# Patient Record
Sex: MALE | Race: WHITE | NOT HISPANIC OR LATINO | Employment: OTHER | URBAN - METROPOLITAN AREA
[De-identification: names, ages, dates, MRNs, and addresses within clinical notes are randomized per-mention and may not be internally consistent; named-entity substitution may affect disease eponyms.]

---

## 2021-12-01 ENCOUNTER — TELEPHONE (OUTPATIENT)
Dept: DIABETES | Facility: CLINIC | Age: 56
End: 2021-12-01
Payer: COMMERCIAL

## 2021-12-15 ENCOUNTER — OFFICE VISIT (OUTPATIENT)
Dept: DIABETES | Facility: CLINIC | Age: 56
End: 2021-12-15
Payer: COMMERCIAL

## 2021-12-15 VITALS
SYSTOLIC BLOOD PRESSURE: 126 MMHG | DIASTOLIC BLOOD PRESSURE: 82 MMHG | HEIGHT: 67 IN | WEIGHT: 177.25 LBS | BODY MASS INDEX: 27.82 KG/M2 | HEART RATE: 100 BPM

## 2021-12-15 DIAGNOSIS — I70.90 ATHEROSCLEROSIS: ICD-10-CM

## 2021-12-15 DIAGNOSIS — Z79.4 UNCONTROLLED TYPE 2 DIABETES MELLITUS WITH HYPERGLYCEMIA, WITH LONG-TERM CURRENT USE OF INSULIN: Primary | ICD-10-CM

## 2021-12-15 DIAGNOSIS — E11.65 UNCONTROLLED TYPE 2 DIABETES MELLITUS WITH HYPERGLYCEMIA, WITH LONG-TERM CURRENT USE OF INSULIN: Primary | ICD-10-CM

## 2021-12-15 DIAGNOSIS — E11.69 DYSLIPIDEMIA WITH LOW HIGH DENSITY LIPOPROTEIN (HDL) CHOLESTEROL WITH HYPERTRIGLYCERIDEMIA DUE TO TYPE 2 DIABETES MELLITUS: ICD-10-CM

## 2021-12-15 DIAGNOSIS — E78.2 DYSLIPIDEMIA WITH LOW HIGH DENSITY LIPOPROTEIN (HDL) CHOLESTEROL WITH HYPERTRIGLYCERIDEMIA DUE TO TYPE 2 DIABETES MELLITUS: ICD-10-CM

## 2021-12-15 DIAGNOSIS — I10 ESSENTIAL HYPERTENSION: ICD-10-CM

## 2021-12-15 PROBLEM — T82.9XXA SIGNIFICANT DISEASE OF CORONARY BYPASS GRAFT: Status: ACTIVE | Noted: 2021-12-15

## 2021-12-15 PROBLEM — M54.16 LUMBAR RADICULOPATHY: Status: ACTIVE | Noted: 2021-07-22

## 2021-12-15 PROBLEM — K58.0 IRRITABLE BOWEL SYNDROME WITH DIARRHEA: Status: ACTIVE | Noted: 2021-12-15

## 2021-12-15 PROBLEM — K21.9 GASTROESOPHAGEAL REFLUX DISEASE: Status: ACTIVE | Noted: 2021-12-15

## 2021-12-15 LAB — GLUCOSE SERPL-MCNC: 195 MG/DL (ref 70–110)

## 2021-12-15 PROCEDURE — 99999 PR PBB SHADOW E&M-EST. PATIENT-LVL III: CPT | Mod: PBBFAC,,, | Performed by: NURSE PRACTITIONER

## 2021-12-15 PROCEDURE — 99213 OFFICE O/P EST LOW 20 MIN: CPT | Mod: S$GLB,,, | Performed by: NURSE PRACTITIONER

## 2021-12-15 PROCEDURE — 82962 GLUCOSE BLOOD TEST: CPT | Mod: S$GLB,,, | Performed by: NURSE PRACTITIONER

## 2021-12-15 PROCEDURE — 82962 POCT GLUCOSE, HAND-HELD DEVICE: ICD-10-PCS | Mod: S$GLB,,, | Performed by: NURSE PRACTITIONER

## 2021-12-15 PROCEDURE — 99213 PR OFFICE/OUTPT VISIT, EST, LEVL III, 20-29 MIN: ICD-10-PCS | Mod: S$GLB,,, | Performed by: NURSE PRACTITIONER

## 2021-12-15 PROCEDURE — 99999 PR PBB SHADOW E&M-EST. PATIENT-LVL III: ICD-10-PCS | Mod: PBBFAC,,, | Performed by: NURSE PRACTITIONER

## 2021-12-15 RX ORDER — METFORMIN HYDROCHLORIDE 1000 MG/1
500 TABLET ORAL 2 TIMES DAILY
COMMUNITY
Start: 2021-11-16 | End: 2021-12-15 | Stop reason: ALTCHOICE

## 2021-12-15 RX ORDER — SEMAGLUTIDE 1.34 MG/ML
INJECTION, SOLUTION SUBCUTANEOUS
COMMUNITY
Start: 2021-12-01 | End: 2021-12-15

## 2021-12-15 RX ORDER — METFORMIN HYDROCHLORIDE 500 MG/1
500 TABLET, EXTENDED RELEASE ORAL 2 TIMES DAILY WITH MEALS
Qty: 60 TABLET | Refills: 5 | Status: SHIPPED | OUTPATIENT
Start: 2021-12-15 | End: 2022-01-24 | Stop reason: SDUPTHER

## 2021-12-15 RX ORDER — CLOPIDOGREL BISULFATE 75 MG/1
75 TABLET ORAL DAILY
COMMUNITY
Start: 2021-10-16

## 2021-12-15 RX ORDER — METFORMIN HYDROCHLORIDE 500 MG/1
500 TABLET, EXTENDED RELEASE ORAL 2 TIMES DAILY WITH MEALS
COMMUNITY
End: 2021-12-15 | Stop reason: SDUPTHER

## 2021-12-15 RX ORDER — NAPROXEN SODIUM 220 MG/1
81 TABLET, FILM COATED ORAL
COMMUNITY

## 2021-12-15 RX ORDER — SEMAGLUTIDE 1.34 MG/ML
1 INJECTION, SOLUTION SUBCUTANEOUS
Qty: 1 PEN | Refills: 5 | Status: SHIPPED | OUTPATIENT
Start: 2021-12-15 | End: 2022-01-12 | Stop reason: SDUPTHER

## 2021-12-15 RX ORDER — ATORVASTATIN CALCIUM 40 MG/1
40 TABLET, FILM COATED ORAL NIGHTLY
COMMUNITY
Start: 2021-10-15

## 2021-12-15 RX ORDER — TESTOSTERONE CYPIONATE 200 MG/ML
INJECTION, SOLUTION INTRAMUSCULAR
COMMUNITY
Start: 2021-09-18

## 2021-12-15 RX ORDER — CICLOPIROX 80 MG/ML
SOLUTION TOPICAL
COMMUNITY
Start: 2021-10-22 | End: 2022-09-06 | Stop reason: SDUPTHER

## 2021-12-15 RX ORDER — PANTOPRAZOLE SODIUM 40 MG/1
40 TABLET, DELAYED RELEASE ORAL DAILY
COMMUNITY
Start: 2021-10-15

## 2021-12-15 RX ORDER — CHOLECALCIFEROL (VITAMIN D3) 50 MCG
CAPSULE ORAL
COMMUNITY

## 2022-01-03 ENCOUNTER — TELEPHONE (OUTPATIENT)
Dept: DIABETES | Facility: CLINIC | Age: 57
End: 2022-01-03
Payer: COMMERCIAL

## 2022-01-03 NOTE — TELEPHONE ENCOUNTER
Has he started the increased dose of Ozempic at 1 mg weekly? I really think he needs to increase the Metformin back to max dose, strict low carb diet, increase water intake. If that does not work, he will need a sooner appt so we can discuss alternative medications to add. Keep me updated.

## 2022-01-03 NOTE — TELEPHONE ENCOUNTER
----- Message from Lorena Woods LPN sent at 1/3/2022  1:36 PM CST -----  Contact: self    ----- Message -----  From: Dieter Starr  Sent: 1/3/2022   1:32 PM CST  To: [facility] Staff    Paulo Esquivel would like a message through [facility] in regards to his medication, he states that his blood sugars is raging from 290 to 190 and what changes he needs to make.

## 2022-01-11 ENCOUNTER — PATIENT MESSAGE (OUTPATIENT)
Dept: DIABETES | Facility: CLINIC | Age: 57
End: 2022-01-11
Payer: COMMERCIAL

## 2022-01-11 DIAGNOSIS — Z79.4 UNCONTROLLED TYPE 2 DIABETES MELLITUS WITH HYPERGLYCEMIA, WITH LONG-TERM CURRENT USE OF INSULIN: ICD-10-CM

## 2022-01-11 DIAGNOSIS — E11.65 UNCONTROLLED TYPE 2 DIABETES MELLITUS WITH HYPERGLYCEMIA, WITH LONG-TERM CURRENT USE OF INSULIN: ICD-10-CM

## 2022-01-12 ENCOUNTER — PATIENT MESSAGE (OUTPATIENT)
Dept: DIABETES | Facility: CLINIC | Age: 57
End: 2022-01-12
Payer: COMMERCIAL

## 2022-01-12 RX ORDER — SEMAGLUTIDE 1.34 MG/ML
1 INJECTION, SOLUTION SUBCUTANEOUS
Qty: 1 PEN | Refills: 5 | Status: SHIPPED | OUTPATIENT
Start: 2022-01-12 | End: 2022-03-16

## 2022-01-18 ENCOUNTER — PATIENT MESSAGE (OUTPATIENT)
Dept: DIABETES | Facility: CLINIC | Age: 57
End: 2022-01-18
Payer: COMMERCIAL

## 2022-01-24 ENCOUNTER — PATIENT MESSAGE (OUTPATIENT)
Dept: DIABETES | Facility: CLINIC | Age: 57
End: 2022-01-24

## 2022-01-24 ENCOUNTER — OFFICE VISIT (OUTPATIENT)
Dept: DIABETES | Facility: CLINIC | Age: 57
End: 2022-01-24
Payer: COMMERCIAL

## 2022-01-24 DIAGNOSIS — Z79.4 UNCONTROLLED TYPE 2 DIABETES MELLITUS WITH HYPERGLYCEMIA, WITH LONG-TERM CURRENT USE OF INSULIN: Primary | ICD-10-CM

## 2022-01-24 DIAGNOSIS — E11.65 UNCONTROLLED TYPE 2 DIABETES MELLITUS WITH HYPERGLYCEMIA, WITH LONG-TERM CURRENT USE OF INSULIN: Primary | ICD-10-CM

## 2022-01-24 PROCEDURE — 99212 PR OFFICE/OUTPT VISIT, EST, LEVL II, 10-19 MIN: ICD-10-PCS | Mod: 95,,, | Performed by: NURSE PRACTITIONER

## 2022-01-24 PROCEDURE — 1159F PR MEDICATION LIST DOCUMENTED IN MEDICAL RECORD: ICD-10-PCS | Mod: CPTII,95,, | Performed by: NURSE PRACTITIONER

## 2022-01-24 PROCEDURE — 99212 OFFICE O/P EST SF 10 MIN: CPT | Mod: 95,,, | Performed by: NURSE PRACTITIONER

## 2022-01-24 PROCEDURE — 1159F MED LIST DOCD IN RCRD: CPT | Mod: CPTII,95,, | Performed by: NURSE PRACTITIONER

## 2022-01-24 PROCEDURE — 1160F RVW MEDS BY RX/DR IN RCRD: CPT | Mod: CPTII,95,, | Performed by: NURSE PRACTITIONER

## 2022-01-24 PROCEDURE — 1160F PR REVIEW ALL MEDS BY PRESCRIBER/CLIN PHARMACIST DOCUMENTED: ICD-10-PCS | Mod: CPTII,95,, | Performed by: NURSE PRACTITIONER

## 2022-01-24 RX ORDER — METFORMIN HYDROCHLORIDE 500 MG/1
1000 TABLET, EXTENDED RELEASE ORAL 2 TIMES DAILY WITH MEALS
Qty: 120 TABLET | Refills: 5 | Status: SHIPPED | OUTPATIENT
Start: 2022-01-24 | End: 2022-06-28

## 2022-01-24 RX ORDER — DULAGLUTIDE 4.5 MG/.5ML
4.5 INJECTION, SOLUTION SUBCUTANEOUS
Qty: 4 PEN | Refills: 5 | Status: SHIPPED | OUTPATIENT
Start: 2022-01-24 | End: 2022-08-22

## 2022-01-24 NOTE — PROGRESS NOTES
Established Patient - Audio Only Telehealth Visit     The patient location is: [place], [place]  The chief complaint leading to consultation is: discuss recently elevated BGs and medication options  Visit type: Virtual visit with audio only (telephone)  Total time spent with patient: 15 minutes       The reason for the audio only service rather than synchronous audio and video virtual visit was related to patient preference/necessity.     Each patient to whom I provide medical services by telemedicine is:  (1) informed of the relationship between the physician and patient and the respective role of any other health care provider with respect to management of the patient; and (2) notified that they may decline to receive medical services by telemedicine and may withdraw from such care at any time. Patient verbally consented to receive this service via voice-only telephone call.    Diabetes Medications             metFORMIN (GLUCOPHAGE-XR) 500 MG ER 24hr tablet Take 1 tablet (500 mg total) by mouth 2 (two) times daily with meals. Taking 2 tabs BID    semaglutide (OZEMPIC) 1 mg/dose (4 mg/3 mL) Inject 1 mg into the skin every 7 days.        HPI: Early January 2022, patient reported elevated BGs between 190-290 mg/dL. His Metformin was increased back to max strength. BGs have slowly improved though it did take a few weeks for AM Bgs to reduce. He reports his fasting blood sugar this morning at 134 mg/dL. He attributes the higher BGs in the morning to eating late in the evenings. He has moved this to 7pm. Expresses issue with cost of Ozempic- has a high deductible. He paid > $700 for this prescription. Will see if Trulicity is more affordable once he is out of the deductible. Prescription sent. Otherwise, he has no questions/concerns and is feeling a lot better.      Assessment and plan:      Diagnoses and all orders for this visit:    Uncontrolled type 2 diabetes mellitus with hyperglycemia, with long-term current use of  insulin  -     dulaglutide (TRULICITY) 4.5 mg/0.5 mL pen injector; Inject 4.5 mg into the skin every 7 days.  -     metFORMIN (GLUCOPHAGE-XR) 500 MG ER 24hr tablet; Take 2 tablets (1,000 mg total) by mouth 2 (two) times daily with meals.      -Continue max dose Metformin 2,000 mg daily divided doses. Will see if Trulicity is more affordable than Ozempic. He does have a high deductible plan but even when deductible is met, he is having to pay around $180 per prescription. Will not remove Ozempic RX until we determine which medication he will continue (ozempic or trulicity). He understands not to take both.     Follow up as scheduled.    This service was not originating from a related E/M service provided within the previous 7 days nor will  to an E/M service or procedure within the next 24 hours or my soonest available appointment.  Prevailing standard of care was able to be met in this audio-only visit.

## 2022-03-02 ENCOUNTER — PATIENT MESSAGE (OUTPATIENT)
Dept: DIABETES | Facility: CLINIC | Age: 57
End: 2022-03-02
Payer: COMMERCIAL

## 2022-03-02 NOTE — TELEPHONE ENCOUNTER
Pt  states it went from 300-700 because charles up the strength of it. States he cant afford it. He would rather take regular insulin everyday. Informed ozempic is to lower his a1c and regular insulin will lower is sugar. Informed I will forward to brian

## 2022-03-16 ENCOUNTER — LAB VISIT (OUTPATIENT)
Dept: LAB | Facility: HOSPITAL | Age: 57
End: 2022-03-16
Attending: NURSE PRACTITIONER
Payer: COMMERCIAL

## 2022-03-16 ENCOUNTER — LAB VISIT (OUTPATIENT)
Dept: LAB | Facility: HOSPITAL | Age: 57
End: 2022-03-16
Payer: COMMERCIAL

## 2022-03-16 ENCOUNTER — OFFICE VISIT (OUTPATIENT)
Dept: DIABETES | Facility: CLINIC | Age: 57
End: 2022-03-16
Payer: COMMERCIAL

## 2022-03-16 VITALS
SYSTOLIC BLOOD PRESSURE: 136 MMHG | DIASTOLIC BLOOD PRESSURE: 78 MMHG | WEIGHT: 174.81 LBS | BODY MASS INDEX: 27.44 KG/M2 | HEIGHT: 67 IN | HEART RATE: 100 BPM

## 2022-03-16 DIAGNOSIS — I70.90 ATHEROSCLEROSIS: ICD-10-CM

## 2022-03-16 DIAGNOSIS — E11.69 DYSLIPIDEMIA WITH LOW HIGH DENSITY LIPOPROTEIN (HDL) CHOLESTEROL WITH HYPERTRIGLYCERIDEMIA DUE TO TYPE 2 DIABETES MELLITUS: ICD-10-CM

## 2022-03-16 DIAGNOSIS — I10 ESSENTIAL HYPERTENSION: ICD-10-CM

## 2022-03-16 DIAGNOSIS — E11.65 UNCONTROLLED TYPE 2 DIABETES MELLITUS WITH HYPERGLYCEMIA, WITH LONG-TERM CURRENT USE OF INSULIN: ICD-10-CM

## 2022-03-16 DIAGNOSIS — Z79.4 UNCONTROLLED TYPE 2 DIABETES MELLITUS WITH HYPERGLYCEMIA, WITH LONG-TERM CURRENT USE OF INSULIN: Primary | ICD-10-CM

## 2022-03-16 DIAGNOSIS — E11.65 UNCONTROLLED TYPE 2 DIABETES MELLITUS WITH HYPERGLYCEMIA, WITH LONG-TERM CURRENT USE OF INSULIN: Primary | ICD-10-CM

## 2022-03-16 DIAGNOSIS — Z79.4 UNCONTROLLED TYPE 2 DIABETES MELLITUS WITH HYPERGLYCEMIA, WITH LONG-TERM CURRENT USE OF INSULIN: ICD-10-CM

## 2022-03-16 DIAGNOSIS — E78.2 DYSLIPIDEMIA WITH LOW HIGH DENSITY LIPOPROTEIN (HDL) CHOLESTEROL WITH HYPERTRIGLYCERIDEMIA DUE TO TYPE 2 DIABETES MELLITUS: ICD-10-CM

## 2022-03-16 LAB
ALBUMIN/CREAT UR: 47.5 UG/MG (ref 0–30)
ANION GAP SERPL CALC-SCNC: 12 MMOL/L (ref 8–16)
BUN SERPL-MCNC: 11 MG/DL (ref 6–20)
CALCIUM SERPL-MCNC: 9.4 MG/DL (ref 8.7–10.5)
CHLORIDE SERPL-SCNC: 108 MMOL/L (ref 95–110)
CO2 SERPL-SCNC: 23 MMOL/L (ref 23–29)
CREAT SERPL-MCNC: 1.1 MG/DL (ref 0.5–1.4)
CREAT UR-MCNC: 202 MG/DL (ref 23–375)
EST. GFR  (AFRICAN AMERICAN): >60 ML/MIN/1.73 M^2
EST. GFR  (NON AFRICAN AMERICAN): >60 ML/MIN/1.73 M^2
ESTIMATED AVG GLUCOSE: 160 MG/DL (ref 68–131)
GLUCOSE SERPL-MCNC: 110 MG/DL (ref 70–110)
GLUCOSE SERPL-MCNC: 90 MG/DL (ref 70–110)
HBA1C MFR BLD: 7.2 % (ref 4–5.6)
MICROALBUMIN UR DL<=1MG/L-MCNC: 96 UG/ML
POTASSIUM SERPL-SCNC: 4.1 MMOL/L (ref 3.5–5.1)
SODIUM SERPL-SCNC: 143 MMOL/L (ref 136–145)

## 2022-03-16 PROCEDURE — 99213 OFFICE O/P EST LOW 20 MIN: CPT | Mod: S$GLB,,, | Performed by: NURSE PRACTITIONER

## 2022-03-16 PROCEDURE — 99999 PR PBB SHADOW E&M-EST. PATIENT-LVL III: ICD-10-PCS | Mod: PBBFAC,,, | Performed by: NURSE PRACTITIONER

## 2022-03-16 PROCEDURE — 99999 PR PBB SHADOW E&M-EST. PATIENT-LVL III: CPT | Mod: PBBFAC,,, | Performed by: NURSE PRACTITIONER

## 2022-03-16 PROCEDURE — 82043 UR ALBUMIN QUANTITATIVE: CPT | Performed by: NURSE PRACTITIONER

## 2022-03-16 PROCEDURE — 1160F PR REVIEW ALL MEDS BY PRESCRIBER/CLIN PHARMACIST DOCUMENTED: ICD-10-PCS | Mod: CPTII,S$GLB,, | Performed by: NURSE PRACTITIONER

## 2022-03-16 PROCEDURE — 1160F RVW MEDS BY RX/DR IN RCRD: CPT | Mod: CPTII,S$GLB,, | Performed by: NURSE PRACTITIONER

## 2022-03-16 PROCEDURE — 83036 HEMOGLOBIN GLYCOSYLATED A1C: CPT | Performed by: NURSE PRACTITIONER

## 2022-03-16 PROCEDURE — 3008F PR BODY MASS INDEX (BMI) DOCUMENTED: ICD-10-PCS | Mod: CPTII,S$GLB,, | Performed by: NURSE PRACTITIONER

## 2022-03-16 PROCEDURE — 80048 BASIC METABOLIC PNL TOTAL CA: CPT | Performed by: NURSE PRACTITIONER

## 2022-03-16 PROCEDURE — 3078F PR MOST RECENT DIASTOLIC BLOOD PRESSURE < 80 MM HG: ICD-10-PCS | Mod: CPTII,S$GLB,, | Performed by: NURSE PRACTITIONER

## 2022-03-16 PROCEDURE — 82962 GLUCOSE BLOOD TEST: CPT | Mod: S$GLB,,, | Performed by: NURSE PRACTITIONER

## 2022-03-16 PROCEDURE — 3078F DIAST BP <80 MM HG: CPT | Mod: CPTII,S$GLB,, | Performed by: NURSE PRACTITIONER

## 2022-03-16 PROCEDURE — 1159F MED LIST DOCD IN RCRD: CPT | Mod: CPTII,S$GLB,, | Performed by: NURSE PRACTITIONER

## 2022-03-16 PROCEDURE — 99213 PR OFFICE/OUTPT VISIT, EST, LEVL III, 20-29 MIN: ICD-10-PCS | Mod: S$GLB,,, | Performed by: NURSE PRACTITIONER

## 2022-03-16 PROCEDURE — 3008F BODY MASS INDEX DOCD: CPT | Mod: CPTII,S$GLB,, | Performed by: NURSE PRACTITIONER

## 2022-03-16 PROCEDURE — 3075F PR MOST RECENT SYSTOLIC BLOOD PRESS GE 130-139MM HG: ICD-10-PCS | Mod: CPTII,S$GLB,, | Performed by: NURSE PRACTITIONER

## 2022-03-16 PROCEDURE — 36415 COLL VENOUS BLD VENIPUNCTURE: CPT | Performed by: NURSE PRACTITIONER

## 2022-03-16 PROCEDURE — 3075F SYST BP GE 130 - 139MM HG: CPT | Mod: CPTII,S$GLB,, | Performed by: NURSE PRACTITIONER

## 2022-03-16 PROCEDURE — 82570 ASSAY OF URINE CREATININE: CPT | Performed by: NURSE PRACTITIONER

## 2022-03-16 PROCEDURE — 82962 POCT GLUCOSE, HAND-HELD DEVICE: ICD-10-PCS | Mod: S$GLB,,, | Performed by: NURSE PRACTITIONER

## 2022-03-16 PROCEDURE — 1159F PR MEDICATION LIST DOCUMENTED IN MEDICAL RECORD: ICD-10-PCS | Mod: CPTII,S$GLB,, | Performed by: NURSE PRACTITIONER

## 2022-03-16 NOTE — PATIENT INSTRUCTIONS
-Schedule eye exam. Have report faxed when completed.    -Labs today.    1. Limit carbs to no more than 30-45 grams with each meal. Never eat carbs by themselves, always add protein. Make snacks low carb or non-carb only; Stick to snack sheet provided    2. Blood sugar goals should be a fasting blood sugar between , and no blood sugars throughout the day over 180 is ok, less than 160 better, less than 140 perfect. Keep a log book and bring with you to all appointments along with your glucose meter.    3. Medications adjusted for today's visit include:  Continue Metformin  mg 2 tablets twice daily.    Continue Trulicity 4.5 mg weekly.     4. Carry glucose tablets with you at all times to treat a low blood sugar episode (less than 70). Chew 4 tablets and re-check blood sugar in 15 minutes. If still low, repeat this. Always call the clinic to give an update for any low blood sugar episodes.    5. Exercise as tolerated- Recommend holding exercise until we get your blood sugars to a safe level.    6. Follow-up for your next visit in 3 months.    7. Please either drop off, fax, or MyChart your readings to me as needed.

## 2022-03-16 NOTE — PROGRESS NOTES
Subjective:         Patient ID: Paulo Esquivel is a 56 y.o. male.  Patient's current PCP is Hussain Toure MD.     Chief Complaint: Diabetes Mellitus    HPI  Paulo sEquivel is a 56 y.o. Unknown male presenting for a new consult with me, previously seen by myself at [facility] for diabetes. Patient has been diagnosed with type 2 diabetes since 2010 .    CURRENT DM MEDICATIONS:   Metformin  mg 2 tabs BID   Trulicity 4.5 mg weekly    Past failed treatment include:  Metformin IR- significant GI side effects. Tradjenta- failure to control diabetes. Ozempic- too expensive     Blood glucose testing is performed sporadically.   Meter:  One Touch Verio  Preferred lab: Dr. Toure's office    Any episodes of hypoglycemia? no     Complications related to diabetes: cardiovascular disease    His blood sugar in the clinic today was:   Lab Results   Component Value Date    POCGLU 110 03/16/2022       Paulo Esquivel presents today for follow up visit to discuss diabetes management. Between visits, he had tried to get off of Metformin due to GI side effects however BGs were >400 when off Metformin. At his last audio only visit 01/24/2022, he had expressed issues with cost of Ozempic (over $700 and at the least paying $180) . Trulicity was sent to see if this was a cheaper option:  Cost is $25, tolerating well, and great glycemic reduction. He is agreeable to complete labs today and has no     He has a good energy level and is exercising 5 days/week.     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      CAD- Cardiologist- Dr. Martinez. H/o Double bypass surgery at [facility] 9/25/18 with Dr. Blackwell.    Health and wellness doctor -- Dr. Adolfo Bernstein.     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    Current diet: Smaller,more frequent meals. Drinks 1 gallon water daily. A little juice with meds daily.  Activity Level:   "works out 5 days/week and on feet at work all day          No results found for: HGBA1C    STANDARDS OF CARE  Eye exam: Dr. Lee -sees yearly-due now  Foot exam: 09/15/2021  Ace/Arb: Ramipril  Statin: Lipitor    Labs reviewed and are noted below.        Lab Results   Component Value Date    WBC 7.92 07/22/2005    HGB 18.2 (H) 07/22/2005    HCT 52.1 07/22/2005     07/22/2005    CHOL 217 (H) 07/22/2005    TRIG 506 (HH) 07/22/2005    HDL 18.0 (L) 07/22/2005    LDLCALC Invalid, Trig > 400 07/22/2005    ALT 38 07/22/2005    AST 20 07/22/2005     07/22/2005    K 4.3 07/22/2005    CL 94 (L) 07/22/2005    CREATININE 1.2 07/22/2005    BUN 12 07/22/2005    CO2 28 07/22/2005     07/22/2005     Lab Results   Component Value Date    CALCIUM 8.8 07/22/2005     No results found for: CPEPTIDE  No results found for: GLUTAMICACID  Glucose   Date Value Ref Range Status   07/22/2005 108 70 - 110 mg/dl Final       The following portions of the patient's history were reviewed and updated as appropriate: allergies, current medications, past family history, past medical history, past social history, past surgical history and problem list.    Review of patient's allergies indicates:   Allergen Reactions    Bee venom protein (honey bee) Swelling     Bee Sting    Morphine      Other reaction(s): Unknown  "doesn't work"      Ace inhibitors Other (See Comments)     Cough     Social History     Socioeconomic History    Marital status: Single   Tobacco Use    Smoking status: Never Smoker    Smokeless tobacco: Never Used     Past Medical History:   Diagnosis Date    CAD (coronary artery disease)     Diabetes mellitus, type 2     Hyperlipidemia     Kidney disease        REVIEW OF SYSTEMS:  Eyes No history of DR.  Cardiovascular: History of CAD. History of HTN and hyperlipidemia.  GI: Tolerating Trulicity well from a GI perspective.  : No CKD.   Neuro: No neuropathy.  PSYCH: No tobacco use.  ENDO: See HPI.    " "    Objective:      Vitals:    03/16/22 1308   BP: 136/78   Pulse: 100     RESPIRATORY: No respiratory distress  NEUROLOGIC: Cranial nerves II-XII grossly intact.   PSYCHIATRIC: Alert & oriented x3. Normal mood and affect.  FOOT EXAMINATION:  UTD  Assessment:       1. Uncontrolled type 2 diabetes mellitus with hyperglycemia, with long-term current use of insulin    2. Essential hypertension    3. Dyslipidemia with low high density lipoprotein (HDL) cholesterol with hypertriglyceridemia due to type 2 diabetes mellitus    4. Atherosclerosis        Plan:   Diagnoses and all orders for this visit:    Uncontrolled type 2 diabetes mellitus with hyperglycemia, with long-term current use of insulin-Chronic  -     POCT Glucose, Hand-Held Device  -     Hemoglobin A1C; Future  -     Microalbumin/Creatinine Ratio, Urine; Future  -     Basic Metabolic Panel; Future    Medications adjusted for today's visit include:  Continue Metformin  mg 2 tablets twice daily.    Continue Trulicity 4.5 mg weekly.     Essential hypertension-Chronic,stable  -     Microalbumin/Creatinine Ratio, Urine; Future  -     Basic Metabolic Panel; Future    -BP at goal. Continue current medications.    Dyslipidemia with low high density lipoprotein (HDL) cholesterol with hypertriglyceridemia due to type 2 diabetes mellitus-Chronic    -Continue Lipitor.    Atherosclerosis-Chronic,stable    -Follow up with cardiology as advised.    - Follow up: 3 months    Portions of this note may have been created with voice recognition software. Occasional "wrong-word" or "sound-a-like" substitutions may have occurred due to the inherent limitations of voice recognition software. Please, read the note carefully and recognize, using context, where substitutions have occurred.         "

## 2022-03-17 NOTE — PROGRESS NOTES
Very mild protein noted in the urine - we will continue to monitor this. The emphasis is on gaining/keeping optimal control of diabetes as well as high blood pressure to prevent any progression to chronic kidney disease. I'll re-check this with next labs to see if remains or if it resolves.

## 2022-03-17 NOTE — PROGRESS NOTES
The A1c does remain slightly elevated at 7.2% but I suspect this is because it has not been a full 3 months with improved blood sugars. Lets re-check in 3 months. Kidneys are functioning normally. Very mild protein noted in the urine - we will continue to monitor this. The emphasis is on gaining/keeping optimal control of diabetes as well as high blood pressure to prevent any progression to chronic kidney disease. I'll re-check this with next labs to see if remains or if it resolves.

## 2022-03-18 ENCOUNTER — TELEPHONE (OUTPATIENT)
Dept: DIABETES | Facility: CLINIC | Age: 57
End: 2022-03-18
Payer: COMMERCIAL

## 2022-03-18 NOTE — TELEPHONE ENCOUNTER
----- Message from Delmi Oliva NP sent at 3/17/2022  9:39 AM CDT -----  The A1c does remain slightly elevated at 7.2% but I suspect this is because it has not been a full 3 months with improved blood sugars. Lets re-check in 3 months. Kidneys are functioning normally. Very mild protein noted in the urine - we will continue to monitor this. The emphasis is on gaining/keeping optimal control of diabetes as well as high blood pressure to prevent any progression to chronic kidney disease. I'll re-check this with next labs to see if remains or if it resolves.

## 2022-07-27 ENCOUNTER — LAB VISIT (OUTPATIENT)
Dept: LAB | Facility: HOSPITAL | Age: 57
End: 2022-07-27
Attending: NURSE PRACTITIONER
Payer: COMMERCIAL

## 2022-07-27 DIAGNOSIS — E11.65 UNCONTROLLED TYPE 2 DIABETES MELLITUS WITH HYPERGLYCEMIA, WITH LONG-TERM CURRENT USE OF INSULIN: Primary | ICD-10-CM

## 2022-07-27 DIAGNOSIS — Z79.4 UNCONTROLLED TYPE 2 DIABETES MELLITUS WITH HYPERGLYCEMIA, WITH LONG-TERM CURRENT USE OF INSULIN: ICD-10-CM

## 2022-07-27 DIAGNOSIS — Z79.4 UNCONTROLLED TYPE 2 DIABETES MELLITUS WITH HYPERGLYCEMIA, WITH LONG-TERM CURRENT USE OF INSULIN: Primary | ICD-10-CM

## 2022-07-27 DIAGNOSIS — E11.65 UNCONTROLLED TYPE 2 DIABETES MELLITUS WITH HYPERGLYCEMIA, WITH LONG-TERM CURRENT USE OF INSULIN: ICD-10-CM

## 2022-07-27 LAB
ESTIMATED AVG GLUCOSE: 131 MG/DL (ref 68–131)
HBA1C MFR BLD: 6.2 % (ref 4–5.6)

## 2022-07-27 PROCEDURE — 36415 COLL VENOUS BLD VENIPUNCTURE: CPT | Performed by: NURSE PRACTITIONER

## 2022-07-27 PROCEDURE — 83036 HEMOGLOBIN GLYCOSYLATED A1C: CPT | Performed by: NURSE PRACTITIONER

## 2022-07-28 ENCOUNTER — PATIENT MESSAGE (OUTPATIENT)
Dept: DIABETES | Facility: CLINIC | Age: 57
End: 2022-07-28
Payer: COMMERCIAL

## 2022-09-06 ENCOUNTER — OFFICE VISIT (OUTPATIENT)
Dept: DIABETES | Facility: CLINIC | Age: 57
End: 2022-09-06
Payer: COMMERCIAL

## 2022-09-06 VITALS
SYSTOLIC BLOOD PRESSURE: 133 MMHG | WEIGHT: 177.94 LBS | DIASTOLIC BLOOD PRESSURE: 79 MMHG | HEIGHT: 67 IN | HEART RATE: 104 BPM | BODY MASS INDEX: 27.93 KG/M2

## 2022-09-06 DIAGNOSIS — I70.90 ATHEROSCLEROSIS: ICD-10-CM

## 2022-09-06 DIAGNOSIS — B35.1 ONYCHOMYCOSIS: ICD-10-CM

## 2022-09-06 DIAGNOSIS — E11.69 CONTROLLED TYPE 2 DIABETES MELLITUS WITH OTHER SPECIFIED COMPLICATION, WITHOUT LONG-TERM CURRENT USE OF INSULIN: Primary | ICD-10-CM

## 2022-09-06 DIAGNOSIS — E11.69 DYSLIPIDEMIA WITH LOW HIGH DENSITY LIPOPROTEIN (HDL) CHOLESTEROL WITH HYPERTRIGLYCERIDEMIA DUE TO TYPE 2 DIABETES MELLITUS: ICD-10-CM

## 2022-09-06 DIAGNOSIS — I10 ESSENTIAL HYPERTENSION: ICD-10-CM

## 2022-09-06 DIAGNOSIS — E78.2 DYSLIPIDEMIA WITH LOW HIGH DENSITY LIPOPROTEIN (HDL) CHOLESTEROL WITH HYPERTRIGLYCERIDEMIA DUE TO TYPE 2 DIABETES MELLITUS: ICD-10-CM

## 2022-09-06 LAB — GLUCOSE SERPL-MCNC: 144 MG/DL (ref 70–110)

## 2022-09-06 PROCEDURE — 3044F PR MOST RECENT HEMOGLOBIN A1C LEVEL <7.0%: ICD-10-PCS | Mod: CPTII,S$GLB,, | Performed by: NURSE PRACTITIONER

## 2022-09-06 PROCEDURE — 1160F RVW MEDS BY RX/DR IN RCRD: CPT | Mod: CPTII,S$GLB,, | Performed by: NURSE PRACTITIONER

## 2022-09-06 PROCEDURE — 1159F MED LIST DOCD IN RCRD: CPT | Mod: CPTII,S$GLB,, | Performed by: NURSE PRACTITIONER

## 2022-09-06 PROCEDURE — 3066F PR DOCUMENTATION OF TREATMENT FOR NEPHROPATHY: ICD-10-PCS | Mod: CPTII,S$GLB,, | Performed by: NURSE PRACTITIONER

## 2022-09-06 PROCEDURE — 82962 GLUCOSE BLOOD TEST: CPT | Mod: S$GLB,,, | Performed by: NURSE PRACTITIONER

## 2022-09-06 PROCEDURE — 3060F PR POS MICROALBUMINURIA RESULT DOCUMENTED/REVIEW: ICD-10-PCS | Mod: CPTII,S$GLB,, | Performed by: NURSE PRACTITIONER

## 2022-09-06 PROCEDURE — 3008F BODY MASS INDEX DOCD: CPT | Mod: CPTII,S$GLB,, | Performed by: NURSE PRACTITIONER

## 2022-09-06 PROCEDURE — 3075F PR MOST RECENT SYSTOLIC BLOOD PRESS GE 130-139MM HG: ICD-10-PCS | Mod: CPTII,S$GLB,, | Performed by: NURSE PRACTITIONER

## 2022-09-06 PROCEDURE — 3008F PR BODY MASS INDEX (BMI) DOCUMENTED: ICD-10-PCS | Mod: CPTII,S$GLB,, | Performed by: NURSE PRACTITIONER

## 2022-09-06 PROCEDURE — 3066F NEPHROPATHY DOC TX: CPT | Mod: CPTII,S$GLB,, | Performed by: NURSE PRACTITIONER

## 2022-09-06 PROCEDURE — 99214 PR OFFICE/OUTPT VISIT, EST, LEVL IV, 30-39 MIN: ICD-10-PCS | Mod: S$GLB,,, | Performed by: NURSE PRACTITIONER

## 2022-09-06 PROCEDURE — 1159F PR MEDICATION LIST DOCUMENTED IN MEDICAL RECORD: ICD-10-PCS | Mod: CPTII,S$GLB,, | Performed by: NURSE PRACTITIONER

## 2022-09-06 PROCEDURE — 99999 PR PBB SHADOW E&M-EST. PATIENT-LVL III: ICD-10-PCS | Mod: PBBFAC,,, | Performed by: NURSE PRACTITIONER

## 2022-09-06 PROCEDURE — 99999 PR PBB SHADOW E&M-EST. PATIENT-LVL III: CPT | Mod: PBBFAC,,, | Performed by: NURSE PRACTITIONER

## 2022-09-06 PROCEDURE — 3075F SYST BP GE 130 - 139MM HG: CPT | Mod: CPTII,S$GLB,, | Performed by: NURSE PRACTITIONER

## 2022-09-06 PROCEDURE — 3060F POS MICROALBUMINURIA REV: CPT | Mod: CPTII,S$GLB,, | Performed by: NURSE PRACTITIONER

## 2022-09-06 PROCEDURE — 1160F PR REVIEW ALL MEDS BY PRESCRIBER/CLIN PHARMACIST DOCUMENTED: ICD-10-PCS | Mod: CPTII,S$GLB,, | Performed by: NURSE PRACTITIONER

## 2022-09-06 PROCEDURE — 99214 OFFICE O/P EST MOD 30 MIN: CPT | Mod: S$GLB,,, | Performed by: NURSE PRACTITIONER

## 2022-09-06 PROCEDURE — 3044F HG A1C LEVEL LT 7.0%: CPT | Mod: CPTII,S$GLB,, | Performed by: NURSE PRACTITIONER

## 2022-09-06 PROCEDURE — 82962 POCT GLUCOSE, HAND-HELD DEVICE: ICD-10-PCS | Mod: S$GLB,,, | Performed by: NURSE PRACTITIONER

## 2022-09-06 PROCEDURE — 3078F PR MOST RECENT DIASTOLIC BLOOD PRESSURE < 80 MM HG: ICD-10-PCS | Mod: CPTII,S$GLB,, | Performed by: NURSE PRACTITIONER

## 2022-09-06 PROCEDURE — 3078F DIAST BP <80 MM HG: CPT | Mod: CPTII,S$GLB,, | Performed by: NURSE PRACTITIONER

## 2022-09-06 RX ORDER — CICLOPIROX 80 MG/ML
SOLUTION TOPICAL
Qty: 6.6 ML | Refills: 5 | Status: SHIPPED | OUTPATIENT
Start: 2022-09-06

## 2022-09-06 NOTE — PROGRESS NOTES
Subjective:         Patient ID: Paulo Esquivel is a 57 y.o. male.  Patient's current PCP is Hussain Toure MD.     Chief Complaint: Diabetes Mellitus    HPI  Paulo Esquivel is a 57 y.o. Unknown male presenting for a follow up for diabetes. Patient has been diagnosed with type 2 diabetes since 2010 .    CURRENT DM MEDICATIONS:   Diabetes Medications               metFORMIN (GLUCOPHAGE-XR) 500 MG ER 24hr tablet TAKE 2 TABLETS BY MOUTH TWICE A DAY WITH MEALS    TRULICITY 4.5 mg/0.5 mL pen injector INJECT 4.5 MG INTO THE SKIN EVERY 7 DAYS.           Past failed treatment include:  Metformin IR- significant GI side effects. Tradjenta- failure to control diabetes. Ozempic- too expensive     Blood glucose testing is performed sporadically.   Meter:  One Touch Verio  Preferred lab: Dr. Toure's office    Any episodes of hypoglycemia? no     Complications related to diabetes: cardiovascular disease    His blood sugar in the clinic today was:   Lab Results   Component Value Date    POCGLU 144 (A) 09/06/2022       Paulo Esquivel presents today for follow up visit to discuss diabetes management. No meter/logs today but reports Bgs are stable. He tolerates Metformin fairly well - complaint mainly of excessive gas overnight. Reviewed labs during OV. He feels well. Weight has been stable. No further questions/concerns today.     He has a good energy level and is exercising 5 days/week.     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      CAD- Cardiologist- Dr. Martinez. H/o Double bypass surgery at [facility] 9/25/18 with Dr. Blackwell.    Health and wellness doctor -- Dr. Adolfo Bernstein.     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    Current diet: Smaller,more frequent meals. Drinks 1 gallon water daily. A little juice with meds daily.  Activity Level:  works out 5 days/week and on feet at work all day    Lab Results    Component Value Date    HGBA1C 6.2 (H) 07/27/2022    HGBA1C 7.2 (H) 03/16/2022     STANDARDS OF CARE  Diabetes Management Status    Statin: Taking  ACE/ARB: Not taking    Screening or Prevention Patient's value Goal Complete/Controlled?   HgA1C Testing and Control   Lab Results   Component Value Date    HGBA1C 6.2 (H) 07/27/2022      Annually/Less than 8% Yes     Lipid profile Most Recent Lipid Panel Health Maintenance Topic Completion: Not Found Annually No     LDL control Lab Results   Component Value Date    LDLCALC Invalid, Trig > 400 07/22/2005    Annually/Less than 100 mg/dl  No     Nephropathy screening Lab Results   Component Value Date    LABMICR 96.0 03/16/2022     No results found for: PROTEINUA  No results found for: UTPCR   Annually Yes     Blood pressure BP Readings from Last 1 Encounters:   09/06/22 133/79    Less than 140/90 Yes     Dilated retinal exam Most Recent Eye Exam Date: Not Found Annually No     Foot exam   : 09/06/2022 Annually Yes        Labs reviewed and are noted below.    Lab Results   Component Value Date    WBC 7.92 07/22/2005    HGB 18.2 (H) 07/22/2005    HCT 52.1 07/22/2005     07/22/2005    CHOL 217 (H) 07/22/2005    TRIG 506 (HH) 07/22/2005    HDL 18.0 (L) 07/22/2005    LDLCALC Invalid, Trig > 400 07/22/2005    ALT 38 07/22/2005    AST 20 07/22/2005     03/16/2022    K 4.1 03/16/2022     03/16/2022    ANIONGAP 12 03/16/2022    CREATININE 1.1 03/16/2022    ESTGFRAFRICA >60.0 03/16/2022    EGFRNONAA >60.0 03/16/2022    BUN 11 03/16/2022    CO2 23 03/16/2022    GLU 90 03/16/2022     Lab Results   Component Value Date    CALCIUM 9.4 03/16/2022     No results found for: CPEPTIDE  No results found for: GLUTAMICACID  Glucose   Date Value Ref Range Status   03/16/2022 90 70 - 110 mg/dL Final     Anion Gap   Date Value Ref Range Status   03/16/2022 12 8 - 16 mmol/L Final     eGFR if    Date Value Ref Range Status   03/16/2022 >60.0 >60 mL/min/1.73 m^2  "Final     eGFR if non    Date Value Ref Range Status   03/16/2022 >60.0 >60 mL/min/1.73 m^2 Final     Comment:     Calculation used to obtain the estimated glomerular filtration  rate (eGFR) is the CKD-EPI equation.          The following portions of the patient's history were reviewed and updated as appropriate: allergies, current medications, past family history, past medical history, past social history, past surgical history and problem list.    Review of patient's allergies indicates:   Allergen Reactions    Bee venom protein (honey bee) Swelling     Bee Sting    Morphine      Other reaction(s): Unknown  "doesn't work"      Ace inhibitors Other (See Comments)     Cough     Social History     Socioeconomic History    Marital status: Single   Tobacco Use    Smoking status: Never    Smokeless tobacco: Never     Past Medical History:   Diagnosis Date    CAD (coronary artery disease)     Diabetes mellitus, type 2     Hyperlipidemia     Kidney disease        REVIEW OF SYSTEMS:  Eyes No history of DR.  Cardiovascular: History of CAD. History of HTN and hyperlipidemia.  GI: Tolerating Trulicity well from a GI perspective.  : No CKD.   Neuro: No neuropathy.  PSYCH: No tobacco use.  ENDO: See HPI.        Objective:      Vitals:    09/06/22 1531   BP: 133/79   Pulse: 104       RESPIRATORY: No respiratory distress  NEUROLOGIC: Cranial nerves II-XII grossly intact.   PSYCHIATRIC: Alert & oriented x3. Normal mood and affect.  FOOT EXAMINATION:  Protective Sensation (w/ 10 gram monofilament):  Right: Intact  Left: Intact    Visual Inspection:  Normal -  Bilateral and Nails Intact - without Evidence of Foot Deformity- Bilateral    Pedal Pulses:   Right: Present  Left: Present    Assessment:       1. Controlled type 2 diabetes mellitus with other specified complication, without long-term current use of insulin    2. Essential hypertension    3. Dyslipidemia with low high density lipoprotein (HDL) cholesterol " "with hypertriglyceridemia due to type 2 diabetes mellitus    4. Atherosclerosis    5. Onychomycosis        Plan:   Paulo was seen today for diabetes mellitus.    Diagnoses and all orders for this visit:    Controlled type 2 diabetes mellitus with other specified complication, without long-term current use of insulin  -     POCT Glucose, Hand-Held Device    Chronic,stable-     Medications adjusted for today's visit include:    Continue Metformin  mg 2 tablets twice daily.    Continue Trulicity 4.5 mg weekly.     Essential hypertension    Chronic,stable - Blood pressure is at goal. Continue current medications.    Dyslipidemia with low high density lipoprotein (HDL) cholesterol with hypertriglyceridemia due to type 2 diabetes mellitus-Chronic    -Continue Lipitor.    Atherosclerosis-Chronic,stable    -Follow up with cardiology as advised.    Onychomycosis  -     ciclopirox (PENLAC) 8 % Soln; Apply to affected toe nails once daily. Remove with alcohol every 7 days and re-start.    - Follow up:  6 months    Portions of this note may have been created with voice recognition software. Occasional "wrong-word" or "sound-a-like" substitutions may have occurred due to the inherent limitations of voice recognition software. Please, read the note carefully and recognize, using context, where substitutions have occurred.             Sheri Ammons,FNP-C [facility]   "

## 2022-09-06 NOTE — PATIENT INSTRUCTIONS
-Schedule eye exam. Have report faxed when completed.    1. Limit carbs to no more than 30-45 grams with each meal. Never eat carbs by themselves, always add protein. Make snacks low carb or non-carb only; Stick to snack sheet provided    2. Blood sugar goals should be a fasting blood sugar between , and no blood sugars throughout the day over 180 is ok, less than 160 better, less than 140 perfect. Keep a log book and bring with you to all appointments along with your glucose meter.    3. Medications adjusted for today's visit include:  Continue Metformin  mg 2 tablets twice daily.    Continue Trulicity 4.5 mg weekly.     4. Carry glucose tablets with you at all times to treat a low blood sugar episode (less than 70). Chew 4 tablets and re-check blood sugar in 15 minutes. If still low, repeat this. Always call the clinic to give an update for any low blood sugar episodes.    5. Exercise as tolerated- Recommend holding exercise until we get your blood sugars to a safe level.    6. Follow-up for your next visit in 6 months.    7. Please either drop off, fax, or MyChart your readings to me as needed.

## 2023-01-27 ENCOUNTER — TELEPHONE (OUTPATIENT)
Dept: DIABETES | Facility: CLINIC | Age: 58
End: 2023-01-27
Payer: COMMERCIAL

## 2023-01-27 ENCOUNTER — PATIENT MESSAGE (OUTPATIENT)
Dept: DIABETES | Facility: CLINIC | Age: 58
End: 2023-01-27
Payer: COMMERCIAL

## 2023-01-27 DIAGNOSIS — E11.65 UNCONTROLLED TYPE 2 DIABETES MELLITUS WITH HYPERGLYCEMIA, WITH LONG-TERM CURRENT USE OF INSULIN: ICD-10-CM

## 2023-01-27 DIAGNOSIS — Z79.4 UNCONTROLLED TYPE 2 DIABETES MELLITUS WITH HYPERGLYCEMIA, WITH LONG-TERM CURRENT USE OF INSULIN: ICD-10-CM

## 2023-01-27 DIAGNOSIS — E11.69 CONTROLLED TYPE 2 DIABETES MELLITUS WITH OTHER SPECIFIED COMPLICATION, WITHOUT LONG-TERM CURRENT USE OF INSULIN: Primary | ICD-10-CM

## 2023-01-27 RX ORDER — SEMAGLUTIDE 2.68 MG/ML
2 INJECTION, SOLUTION SUBCUTANEOUS
Qty: 1 PEN | Refills: 5 | Status: SHIPPED | OUTPATIENT
Start: 2023-01-27 | End: 2023-02-14

## 2023-01-27 RX ORDER — DULAGLUTIDE 4.5 MG/.5ML
4.5 INJECTION, SOLUTION SUBCUTANEOUS
Qty: 4 PEN | Refills: 5 | Status: SHIPPED | OUTPATIENT
Start: 2023-01-27 | End: 2023-01-27 | Stop reason: ALTCHOICE

## 2023-01-27 NOTE — TELEPHONE ENCOUNTER
"S/w pt, states trulicity out of stock at [facility] and if you prescribed "both medications that pharmacist will search for availability" -- never heard of this happening and Ozempic is out of stock at that particular pharmacy as well. Notified pt Trulicity 4.5mg available at [facility], pt states he will not be able to make it in time to P/U. Pt states he lives in [place]. I have called a few pharmacies in [place], only pharmacy that has Ozempic in 1mg or 2mg is [facility] in [place]. Please advise.       ----- Message from Jessica Choi sent at 1/27/2023 11:58 AM CST -----  Contact: Paulo Bates needs to get a call back at [phone], Regards to TRULICITY 4.5 mg/0.5 mL pen injector is out of stock and the pharmacy needs a medication change from the Trulicity to Ozempic.      [facility]/pharmacy #[id number] - [place] - [address]  [address]  [address]  [facility] [place] [place] [address]  Phone: [phone] Fax: [phone]    Thanks  Td      "

## 2023-01-27 NOTE — TELEPHONE ENCOUNTER
----- Message from Juan Daniel Khanna sent at 1/26/2023  2:25 PM CST -----  Contact: Paulo  Type:  RX Refill Request    Who Called: Paulo  Refill or New Rx:refill  RX Name and Strength:TRULICITY 4.5 mg/0.5 mL pen injector  How is the patient currently taking it? (ex. 1XDay):1xweek  Is this a 30 day or 90 day RX:30  Preferred Pharmacy with phone number:  [id number] [place] [facility] [address]  [address]  [address]  [place] [address]  Phone: [phone] Fax: [phone]  Local or Mail Order:local  Ordering Provider: Glenny Oliva  Would the patient rather a call back or a response via [facility]? Call back  Best Call Back Number:[phone]  Additional Information: needs it immediately is already a day late taking it

## 2023-01-30 ENCOUNTER — PATIENT MESSAGE (OUTPATIENT)
Dept: DIABETES | Facility: CLINIC | Age: 58
End: 2023-01-30
Payer: COMMERCIAL

## 2023-01-30 ENCOUNTER — TELEPHONE (OUTPATIENT)
Dept: DIABETES | Facility: CLINIC | Age: 58
End: 2023-01-30
Payer: COMMERCIAL

## 2023-01-30 DIAGNOSIS — E11.69 CONTROLLED TYPE 2 DIABETES MELLITUS WITH OTHER SPECIFIED COMPLICATION, WITHOUT LONG-TERM CURRENT USE OF INSULIN: Primary | ICD-10-CM

## 2023-02-01 NOTE — TELEPHONE ENCOUNTER
No answer. Left voicemail. Will call pharmacy to see what info they can give.     Called [facility] and Ozempic is $771 and that is with insurance and discount card.    Complex Repair And Flap Additional Text (Will Appearing After The Standard Complex Repair Text): The complex repair was not sufficient to completely close the primary defect. The remaining additional defect was repaired with the flap mentioned below.

## 2023-02-14 ENCOUNTER — PATIENT MESSAGE (OUTPATIENT)
Dept: DIABETES | Facility: CLINIC | Age: 58
End: 2023-02-14
Payer: COMMERCIAL

## 2023-02-14 DIAGNOSIS — E11.69 CONTROLLED TYPE 2 DIABETES MELLITUS WITH OTHER SPECIFIED COMPLICATION, WITHOUT LONG-TERM CURRENT USE OF INSULIN: Primary | ICD-10-CM

## 2023-02-14 RX ORDER — DULAGLUTIDE 4.5 MG/.5ML
4.5 INJECTION, SOLUTION SUBCUTANEOUS
Qty: 4 PEN | Refills: 5 | Status: SHIPPED | OUTPATIENT
Start: 2023-02-14 | End: 2023-05-08

## 2023-02-14 NOTE — TELEPHONE ENCOUNTER
Please call to check on price of Trulicity as soon as we can -Ozempic was too expensive and it has been 3 weeks since he last had his medicine.  Let me know!

## 2023-02-15 ENCOUNTER — PATIENT MESSAGE (OUTPATIENT)
Dept: DIABETES | Facility: CLINIC | Age: 58
End: 2023-02-15
Payer: COMMERCIAL

## 2023-02-15 RX ORDER — LINAGLIPTIN 5 MG/1
5 TABLET, FILM COATED ORAL DAILY
Qty: 90 TABLET | Refills: 3 | Status: SHIPPED | OUTPATIENT
Start: 2023-02-15 | End: 2023-05-08 | Stop reason: SDUPTHER

## 2023-02-17 ENCOUNTER — PATIENT MESSAGE (OUTPATIENT)
Dept: DIABETES | Facility: CLINIC | Age: 58
End: 2023-02-17
Payer: COMMERCIAL

## 2023-02-17 DIAGNOSIS — E78.2 DYSLIPIDEMIA WITH LOW HIGH DENSITY LIPOPROTEIN (HDL) CHOLESTEROL WITH HYPERTRIGLYCERIDEMIA DUE TO TYPE 2 DIABETES MELLITUS: ICD-10-CM

## 2023-02-17 DIAGNOSIS — E11.69 DYSLIPIDEMIA WITH LOW HIGH DENSITY LIPOPROTEIN (HDL) CHOLESTEROL WITH HYPERTRIGLYCERIDEMIA DUE TO TYPE 2 DIABETES MELLITUS: ICD-10-CM

## 2023-02-17 DIAGNOSIS — E11.69 CONTROLLED TYPE 2 DIABETES MELLITUS WITH OTHER SPECIFIED COMPLICATION, WITHOUT LONG-TERM CURRENT USE OF INSULIN: Primary | ICD-10-CM

## 2023-02-17 DIAGNOSIS — I10 ESSENTIAL HYPERTENSION: ICD-10-CM

## 2023-02-17 NOTE — TELEPHONE ENCOUNTER
Please give patient a call - lab orders placed. Please assist with scheduling labs with protein-urine screening fasting at the [facility] when convenient for the patient. Thanks!

## 2023-02-21 ENCOUNTER — PATIENT MESSAGE (OUTPATIENT)
Dept: DIABETES | Facility: CLINIC | Age: 58
End: 2023-02-21
Payer: COMMERCIAL

## 2023-02-23 ENCOUNTER — LAB VISIT (OUTPATIENT)
Dept: LAB | Facility: HOSPITAL | Age: 58
End: 2023-02-23
Attending: NURSE PRACTITIONER
Payer: COMMERCIAL

## 2023-02-23 DIAGNOSIS — E11.69 CONTROLLED TYPE 2 DIABETES MELLITUS WITH OTHER SPECIFIED COMPLICATION, WITHOUT LONG-TERM CURRENT USE OF INSULIN: ICD-10-CM

## 2023-02-23 DIAGNOSIS — E11.69 DYSLIPIDEMIA WITH LOW HIGH DENSITY LIPOPROTEIN (HDL) CHOLESTEROL WITH HYPERTRIGLYCERIDEMIA DUE TO TYPE 2 DIABETES MELLITUS: ICD-10-CM

## 2023-02-23 DIAGNOSIS — E78.2 DYSLIPIDEMIA WITH LOW HIGH DENSITY LIPOPROTEIN (HDL) CHOLESTEROL WITH HYPERTRIGLYCERIDEMIA DUE TO TYPE 2 DIABETES MELLITUS: ICD-10-CM

## 2023-02-23 DIAGNOSIS — I10 ESSENTIAL HYPERTENSION: ICD-10-CM

## 2023-02-23 LAB
ALBUMIN SERPL BCP-MCNC: 3.7 G/DL (ref 3.5–5.2)
ALP SERPL-CCNC: 100 U/L (ref 55–135)
ALT SERPL W/O P-5'-P-CCNC: 61 U/L (ref 10–44)
ANION GAP SERPL CALC-SCNC: 11 MMOL/L (ref 8–16)
AST SERPL-CCNC: 35 U/L (ref 10–40)
BILIRUB SERPL-MCNC: 0.5 MG/DL (ref 0.1–1)
BUN SERPL-MCNC: 11 MG/DL (ref 6–20)
CALCIUM SERPL-MCNC: 9.8 MG/DL (ref 8.7–10.5)
CHLORIDE SERPL-SCNC: 104 MMOL/L (ref 95–110)
CHOLEST SERPL-MCNC: 99 MG/DL (ref 120–199)
CHOLEST/HDLC SERPL: 4.5 {RATIO} (ref 2–5)
CO2 SERPL-SCNC: 26 MMOL/L (ref 23–29)
CREAT SERPL-MCNC: 0.9 MG/DL (ref 0.5–1.4)
EST. GFR  (NO RACE VARIABLE): >60 ML/MIN/1.73 M^2
ESTIMATED AVG GLUCOSE: 146 MG/DL (ref 68–131)
GLUCOSE SERPL-MCNC: 106 MG/DL (ref 70–110)
HBA1C MFR BLD: 6.7 % (ref 4–5.6)
HDLC SERPL-MCNC: 22 MG/DL (ref 40–75)
HDLC SERPL: 22.2 % (ref 20–50)
LDLC SERPL CALC-MCNC: 53.6 MG/DL (ref 63–159)
NONHDLC SERPL-MCNC: 77 MG/DL
POTASSIUM SERPL-SCNC: 4.3 MMOL/L (ref 3.5–5.1)
PROT SERPL-MCNC: 7.2 G/DL (ref 6–8.4)
SODIUM SERPL-SCNC: 141 MMOL/L (ref 136–145)
TRIGL SERPL-MCNC: 117 MG/DL (ref 30–150)
TSH SERPL DL<=0.005 MIU/L-ACNC: 3.49 UIU/ML (ref 0.4–4)

## 2023-02-23 PROCEDURE — 80061 LIPID PANEL: CPT | Performed by: NURSE PRACTITIONER

## 2023-02-23 PROCEDURE — 83036 HEMOGLOBIN GLYCOSYLATED A1C: CPT | Performed by: NURSE PRACTITIONER

## 2023-02-23 PROCEDURE — 36415 COLL VENOUS BLD VENIPUNCTURE: CPT | Performed by: NURSE PRACTITIONER

## 2023-02-23 PROCEDURE — 80053 COMPREHEN METABOLIC PANEL: CPT | Performed by: NURSE PRACTITIONER

## 2023-02-23 PROCEDURE — 84443 ASSAY THYROID STIM HORMONE: CPT | Performed by: NURSE PRACTITIONER

## 2023-02-24 ENCOUNTER — TELEPHONE (OUTPATIENT)
Dept: DIABETES | Facility: CLINIC | Age: 58
End: 2023-02-24
Payer: COMMERCIAL

## 2023-02-24 NOTE — PROGRESS NOTES
No microalbuminuria noted. Normal kidney function. Cholesterol panel looks good - the HDL,good cholesterol, is a low at 22 but better than previous. Triglycerides are now normal.A1c remains at goal at 6.7%.  Thyroid level is normal. One of the liver enzymes mildly elevated- will continue to monitor.

## 2023-02-24 NOTE — TELEPHONE ENCOUNTER
Spoke with patient regarding results. ----- Message from Delmi Oliva NP sent at 2/24/2023  9:49 AM CST -----  No microalbuminuria noted. Normal kidney function. Cholesterol panel looks good - the HDL,good cholesterol, is a low at 22 but better than previous. Triglycerides are now normal.A1c remains at goal at 6.7%.  Thyroid level is normal. One of the liver enzymes mildly elevated- will continue to monitor.

## 2023-03-10 ENCOUNTER — TELEPHONE (OUTPATIENT)
Dept: DIABETES | Facility: CLINIC | Age: 58
End: 2023-03-10
Payer: COMMERCIAL

## 2023-03-10 NOTE — TELEPHONE ENCOUNTER
Approval for Trulicity 4.5mg/0.5ml per [facility]. Good from 3/10/23 - 3/9/24. Case ID [id number]. Faxed to Pharmacy

## 2023-03-13 ENCOUNTER — TELEPHONE (OUTPATIENT)
Dept: DIABETES | Facility: CLINIC | Age: 58
End: 2023-03-13
Payer: COMMERCIAL

## 2023-05-08 ENCOUNTER — PATIENT MESSAGE (OUTPATIENT)
Dept: DIABETES | Facility: CLINIC | Age: 58
End: 2023-05-08
Payer: COMMERCIAL

## 2023-05-08 DIAGNOSIS — E11.69 CONTROLLED TYPE 2 DIABETES MELLITUS WITH OTHER SPECIFIED COMPLICATION, WITHOUT LONG-TERM CURRENT USE OF INSULIN: Primary | ICD-10-CM

## 2023-05-08 DIAGNOSIS — E11.69 CONTROLLED TYPE 2 DIABETES MELLITUS WITH OTHER SPECIFIED COMPLICATION, WITHOUT LONG-TERM CURRENT USE OF INSULIN: ICD-10-CM

## 2023-05-08 RX ORDER — LINAGLIPTIN 5 MG/1
5 TABLET, FILM COATED ORAL DAILY
Qty: 90 TABLET | Refills: 3 | Status: SHIPPED | OUTPATIENT
Start: 2023-05-08 | End: 2023-05-09

## 2023-05-09 ENCOUNTER — PATIENT MESSAGE (OUTPATIENT)
Dept: DIABETES | Facility: CLINIC | Age: 58
End: 2023-05-09
Payer: COMMERCIAL

## 2023-05-09 RX ORDER — GLIMEPIRIDE 4 MG/1
4 TABLET ORAL
Qty: 30 TABLET | Refills: 5 | Status: SHIPPED | OUTPATIENT
Start: 2023-05-09 | End: 2023-06-19

## 2023-06-01 ENCOUNTER — PATIENT MESSAGE (OUTPATIENT)
Dept: DIABETES | Facility: CLINIC | Age: 58
End: 2023-06-01
Payer: COMMERCIAL

## 2023-06-01 DIAGNOSIS — E11.69 CONTROLLED TYPE 2 DIABETES MELLITUS WITH OTHER SPECIFIED COMPLICATION, WITHOUT LONG-TERM CURRENT USE OF INSULIN: Primary | ICD-10-CM

## 2023-06-01 RX ORDER — INSULIN PUMP SYRINGE, 3 ML
EACH MISCELLANEOUS
Qty: 1 EACH | Refills: 0 | Status: SHIPPED | OUTPATIENT
Start: 2023-06-01 | End: 2024-05-31

## 2023-06-01 RX ORDER — LANCETS
EACH MISCELLANEOUS
Qty: 100 EACH | Refills: 1 | Status: SHIPPED | OUTPATIENT
Start: 2023-06-01

## 2023-06-01 NOTE — TELEPHONE ENCOUNTER
Courtesy refill for Delmi Oliva NP     Insurance preferred Meter and supplies Rx today      Riya Sena PA-C  Diabetes Management

## 2023-06-19 ENCOUNTER — TELEPHONE (OUTPATIENT)
Dept: DIABETES | Facility: CLINIC | Age: 58
End: 2023-06-19
Payer: COMMERCIAL

## 2023-06-19 ENCOUNTER — OFFICE VISIT (OUTPATIENT)
Dept: DIABETES | Facility: CLINIC | Age: 58
End: 2023-06-19
Payer: COMMERCIAL

## 2023-06-19 VITALS
SYSTOLIC BLOOD PRESSURE: 163 MMHG | HEART RATE: 104 BPM | BODY MASS INDEX: 28.75 KG/M2 | DIASTOLIC BLOOD PRESSURE: 77 MMHG | WEIGHT: 183.19 LBS | HEIGHT: 67 IN

## 2023-06-19 DIAGNOSIS — E11.69 CONTROLLED TYPE 2 DIABETES MELLITUS WITH OTHER SPECIFIED COMPLICATION, WITHOUT LONG-TERM CURRENT USE OF INSULIN: Primary | ICD-10-CM

## 2023-06-19 DIAGNOSIS — I70.90 ATHEROSCLEROSIS: ICD-10-CM

## 2023-06-19 DIAGNOSIS — E11.69 DYSLIPIDEMIA WITH LOW HIGH DENSITY LIPOPROTEIN (HDL) CHOLESTEROL WITH HYPERTRIGLYCERIDEMIA DUE TO TYPE 2 DIABETES MELLITUS: ICD-10-CM

## 2023-06-19 DIAGNOSIS — E78.2 DYSLIPIDEMIA WITH LOW HIGH DENSITY LIPOPROTEIN (HDL) CHOLESTEROL WITH HYPERTRIGLYCERIDEMIA DUE TO TYPE 2 DIABETES MELLITUS: ICD-10-CM

## 2023-06-19 DIAGNOSIS — I10 ESSENTIAL HYPERTENSION: ICD-10-CM

## 2023-06-19 LAB — GLUCOSE SERPL-MCNC: 153 MG/DL (ref 70–110)

## 2023-06-19 PROCEDURE — 1159F PR MEDICATION LIST DOCUMENTED IN MEDICAL RECORD: ICD-10-PCS | Mod: CPTII,S$GLB,, | Performed by: NURSE PRACTITIONER

## 2023-06-19 PROCEDURE — 1160F PR REVIEW ALL MEDS BY PRESCRIBER/CLIN PHARMACIST DOCUMENTED: ICD-10-PCS | Mod: CPTII,S$GLB,, | Performed by: NURSE PRACTITIONER

## 2023-06-19 PROCEDURE — 1160F RVW MEDS BY RX/DR IN RCRD: CPT | Mod: CPTII,S$GLB,, | Performed by: NURSE PRACTITIONER

## 2023-06-19 PROCEDURE — 3077F SYST BP >= 140 MM HG: CPT | Mod: CPTII,S$GLB,, | Performed by: NURSE PRACTITIONER

## 2023-06-19 PROCEDURE — 3066F NEPHROPATHY DOC TX: CPT | Mod: CPTII,S$GLB,, | Performed by: NURSE PRACTITIONER

## 2023-06-19 PROCEDURE — 3044F HG A1C LEVEL LT 7.0%: CPT | Mod: CPTII,S$GLB,, | Performed by: NURSE PRACTITIONER

## 2023-06-19 PROCEDURE — 3078F DIAST BP <80 MM HG: CPT | Mod: CPTII,S$GLB,, | Performed by: NURSE PRACTITIONER

## 2023-06-19 PROCEDURE — 99999 PR PBB SHADOW E&M-EST. PATIENT-LVL IV: ICD-10-PCS | Mod: PBBFAC,,, | Performed by: NURSE PRACTITIONER

## 2023-06-19 PROCEDURE — 82962 POCT GLUCOSE, HAND-HELD DEVICE: ICD-10-PCS | Mod: S$GLB,,, | Performed by: NURSE PRACTITIONER

## 2023-06-19 PROCEDURE — 1159F MED LIST DOCD IN RCRD: CPT | Mod: CPTII,S$GLB,, | Performed by: NURSE PRACTITIONER

## 2023-06-19 PROCEDURE — 3078F PR MOST RECENT DIASTOLIC BLOOD PRESSURE < 80 MM HG: ICD-10-PCS | Mod: CPTII,S$GLB,, | Performed by: NURSE PRACTITIONER

## 2023-06-19 PROCEDURE — 3061F PR NEG MICROALBUMINURIA RESULT DOCUMENTED/REVIEW: ICD-10-PCS | Mod: CPTII,S$GLB,, | Performed by: NURSE PRACTITIONER

## 2023-06-19 PROCEDURE — 82962 GLUCOSE BLOOD TEST: CPT | Mod: S$GLB,,, | Performed by: NURSE PRACTITIONER

## 2023-06-19 PROCEDURE — 3061F NEG MICROALBUMINURIA REV: CPT | Mod: CPTII,S$GLB,, | Performed by: NURSE PRACTITIONER

## 2023-06-19 PROCEDURE — 99214 OFFICE O/P EST MOD 30 MIN: CPT | Mod: S$GLB,,, | Performed by: NURSE PRACTITIONER

## 2023-06-19 PROCEDURE — 3066F PR DOCUMENTATION OF TREATMENT FOR NEPHROPATHY: ICD-10-PCS | Mod: CPTII,S$GLB,, | Performed by: NURSE PRACTITIONER

## 2023-06-19 PROCEDURE — 99214 PR OFFICE/OUTPT VISIT, EST, LEVL IV, 30-39 MIN: ICD-10-PCS | Mod: S$GLB,,, | Performed by: NURSE PRACTITIONER

## 2023-06-19 PROCEDURE — 99999 PR PBB SHADOW E&M-EST. PATIENT-LVL IV: CPT | Mod: PBBFAC,,, | Performed by: NURSE PRACTITIONER

## 2023-06-19 PROCEDURE — 3008F BODY MASS INDEX DOCD: CPT | Mod: CPTII,S$GLB,, | Performed by: NURSE PRACTITIONER

## 2023-06-19 PROCEDURE — 3077F PR MOST RECENT SYSTOLIC BLOOD PRESSURE >= 140 MM HG: ICD-10-PCS | Mod: CPTII,S$GLB,, | Performed by: NURSE PRACTITIONER

## 2023-06-19 PROCEDURE — 3008F PR BODY MASS INDEX (BMI) DOCUMENTED: ICD-10-PCS | Mod: CPTII,S$GLB,, | Performed by: NURSE PRACTITIONER

## 2023-06-19 PROCEDURE — 3044F PR MOST RECENT HEMOGLOBIN A1C LEVEL <7.0%: ICD-10-PCS | Mod: CPTII,S$GLB,, | Performed by: NURSE PRACTITIONER

## 2023-06-19 RX ORDER — GLIMEPIRIDE 2 MG/1
2 TABLET ORAL
Qty: 90 TABLET | Refills: 3 | Status: SHIPPED | OUTPATIENT
Start: 2023-06-19 | End: 2024-06-18

## 2023-06-19 NOTE — PROGRESS NOTES
Subjective:         Patient ID: Paulo Esquivel is a 58 y.o. male.  Patient's current PCP is Hussain Toure MD.     Chief Complaint: Diabetes Mellitus      HPI  Paulo Esquivel is a 58 y.o. Unknown male presenting for a follow up for diabetes. Patient has been diagnosed with type 2 diabetes since 2010 .    CURRENT DM MEDICATIONS:   Diabetes Medications               glimepiride (AMARYL) 4 MG tablet Take 1 tablet (4 mg total) by mouth before breakfast. Causing hypoglycemia    metFORMIN (GLUCOPHAGE-XR) 500 MG ER 24hr tablet TAKE 2 TABLETS BY MOUTH TWICE A DAY WITH MEALS           Past failed treatment include:  Metformin IR- significant GI side effects. Tradjenta- failure to control diabetes. GLP1s- too expensive     Blood glucose testing is performed sporadically.   Meter:  One Touch Verio  Preferred lab: Dr. Toure's office    Any episodes of hypoglycemia? no     Complications related to diabetes: cardiovascular disease    His blood sugar in the clinic today was:   Lab Results   Component Value Date    POCGLU 153 (A) 06/19/2023     Paulo Esquivel presents today for follow up visit to discuss diabetes management. Kept fasting logs- blood sugar range since start of Glimepiride range 70-120s at the highest, however has had a couple of overnight lows recently. Discussed MOA of Glimepiride - will decrease dose.  On Amaryl due to cost only-GLP1 agonists too expensive.     He has a good energy level and is exercising 5 days/week.     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      CAD- Cardiologist- Dr. Martinez. H/o Double bypass surgery at [facility] 9/25/18 with Dr. Blackwell.    Health and wellness doctor -- Dr. Adolfo Bernstein.     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    Current diet: Smaller,more frequent meals. Drinks 1 gallon water daily. A little juice with meds daily.  Activity Level:  works out 5 days/week  and on feet at work all day    Lab Results   Component Value Date    HGBA1C 6.7 (H) 02/23/2023    HGBA1C 6.2 (H) 07/27/2022    HGBA1C 7.2 (H) 03/16/2022     STANDARDS OF CARE  Diabetes Management Status    Statin: Taking  ACE/ARB: Not taking    Screening or Prevention Patient's value Goal Complete/Controlled?   HgA1C Testing and Control   Lab Results   Component Value Date    HGBA1C 6.7 (H) 02/23/2023      Annually/Less than 8% Yes   Lipid profile : 02/23/2023 Annually Yes   LDL control Lab Results   Component Value Date    LDLCALC 53.6 (L) 02/23/2023    Annually/Less than 100 mg/dl  Yes   Nephropathy screening Lab Results   Component Value Date    LABMICR 78.0 02/23/2023     No results found for: PROTEINUA  No results found for: UTPCR   Annually Yes   Blood pressure BP Readings from Last 1 Encounters:   06/19/23 (!) 163/77    Less than 140/90 No   Dilated retinal exam Most Recent Eye Exam Date: Not Found Annually No   Foot exam   : 09/06/2022 Annually Yes     Labs: Requested from PCP     Lab Results   Component Value Date    WBC 7.92 07/22/2005    HGB 18.2 (H) 07/22/2005    HCT 52.1 07/22/2005     07/22/2005    CHOL 99 (L) 02/23/2023    TRIG 117 02/23/2023    HDL 22 (L) 02/23/2023    LDLCALC 53.6 (L) 02/23/2023    ALT 61 (H) 02/23/2023    AST 35 02/23/2023     02/23/2023    K 4.3 02/23/2023     02/23/2023    ANIONGAP 11 02/23/2023    CREATININE 0.9 02/23/2023    ESTGFRAFRICA >60.0 03/16/2022    EGFRNONAA >60.0 03/16/2022    BUN 11 02/23/2023    CO2 26 02/23/2023    TSH 3.493 02/23/2023     02/23/2023     Lab Results   Component Value Date    TSH 3.493 02/23/2023    FERRITIN 16.6 (L) 06/23/2022    CALCIUM 9.8 02/23/2023     No results found for: CPEPTIDE  No results found for: GLUTAMICACID  Glucose   Date Value Ref Range Status   02/23/2023 106 70 - 110 mg/dL Final     Anion Gap   Date Value Ref Range Status   02/23/2023 11 8 - 16 mmol/L Final     eGFR if    Date Value Ref  "Range Status   03/16/2022 >60.0 >60 mL/min/1.73 m^2 Final     eGFR if non    Date Value Ref Range Status   03/16/2022 >60.0 >60 mL/min/1.73 m^2 Final     Comment:     Calculation used to obtain the estimated glomerular filtration  rate (eGFR) is the CKD-EPI equation.          The following portions of the patient's history were reviewed and updated as appropriate: allergies, current medications, past family history, past medical history, past social history, past surgical history and problem list.    Review of patient's allergies indicates:   Allergen Reactions    Bee venom protein (honey bee) Swelling     Bee Sting    Morphine      Other reaction(s): Unknown  "doesn't work"      Ace inhibitors Other (See Comments)     Cough     Social History     Socioeconomic History    Marital status: Single   Tobacco Use    Smoking status: Never    Smokeless tobacco: Never     Past Medical History:   Diagnosis Date    CAD (coronary artery disease)     Diabetes mellitus, type 2     Hyperlipidemia     Kidney disease        REVIEW OF SYSTEMS:  Eyes No history of DR.  Cardiovascular: History of CAD. History of HTN and hyperlipidemia.  GI: Previously tolerated Trulicity well from a GI perspective.   : No CKD.   Neuro: No neuropathy.  PSYCH: No tobacco use.  ENDO: See HPI.        Objective:      Vitals:    06/19/23 1322   BP: (!) 163/77   Pulse: 104     RESPIRATORY: No respiratory distress  NEUROLOGIC: Cranial nerves II-XII grossly intact.   PSYCHIATRIC: Alert & oriented x3. Normal mood and affect.  FOOT EXAMINATION: UTD    Assessment:       1. Controlled type 2 diabetes mellitus with other specified complication, without long-term current use of insulin    2. Essential hypertension    3. Dyslipidemia with low high density lipoprotein (HDL) cholesterol with hypertriglyceridemia due to type 2 diabetes mellitus    4. Atherosclerosis        Plan:   Paulo was seen today for diabetes mellitus.    Diagnoses and all orders " "for this visit:    Controlled type 2 diabetes mellitus with other specified complication, without long-term current use of insulin  -     POCT Glucose, Hand-Held Device  -     glimepiride (AMARYL) 2 MG tablet; Take 1 tablet (2 mg total) by mouth before breakfast.    Chronic- Cut dose of Glimepiride in half until out, then start reduced dose of 2 mg daily before breakfast.    On Amaryl due to cost.     Essential hypertension    Chronic,stable -Continue current medications.    Dyslipidemia with low high density lipoprotein (HDL) cholesterol with hypertriglyceridemia due to type 2 diabetes mellitus-Chronic    -Continue Lipitor.    Atherosclerosis-Chronic,stable    -Follow up with cardiology as advised.    - Follow up:  6 months    Portions of this note may have been created with voice recognition software. Occasional "wrong-word" or "sound-a-like" substitutions may have occurred due to the inherent limitations of voice recognition software. Please, read the note carefully and recognize, using context, where substitutions have occurred.             Sheri Ammons,FNP-C [facility]     "

## 2023-06-19 NOTE — PATIENT INSTRUCTIONS
-Schedule eye exam. Have report faxed when completed.    Blood sugar goals should be a fasting blood sugar between , and no blood sugars throughout the day over 180 is ok, less than 160 better, less than 140 perfect. Keep a log book and bring with you to all appointments along with your glucose meter.    Medications adjusted for today's visit include:    Continue Metformin  mg 2 tablets twice daily.    Decrease Glimepiride to 2 mg daily. Let me know if hypoglycemia does not resolve.     Carry glucose tablets with you at all times to treat a low blood sugar episode (less than 70). Chew 4 tablets and re-check blood sugar in 15 minutes. If still low, repeat this. Always call the clinic to give an update for any low blood sugar episodes.

## 2023-10-21 DIAGNOSIS — E11.69 CONTROLLED TYPE 2 DIABETES MELLITUS WITH OTHER SPECIFIED COMPLICATION, WITHOUT LONG-TERM CURRENT USE OF INSULIN: ICD-10-CM

## 2023-10-23 RX ORDER — CALCIUM CITRATE/VITAMIN D3 200MG-6.25
TABLET ORAL
Qty: 100 STRIP | Refills: 3 | Status: SHIPPED | OUTPATIENT
Start: 2023-10-23

## 2023-12-19 ENCOUNTER — TELEPHONE (OUTPATIENT)
Dept: DIABETES | Facility: CLINIC | Age: 58
End: 2023-12-19
Payer: COMMERCIAL

## 2023-12-19 ENCOUNTER — OFFICE VISIT (OUTPATIENT)
Dept: DIABETES | Facility: CLINIC | Age: 58
End: 2023-12-19
Payer: COMMERCIAL

## 2023-12-19 VITALS
SYSTOLIC BLOOD PRESSURE: 126 MMHG | BODY MASS INDEX: 28.68 KG/M2 | HEART RATE: 106 BPM | WEIGHT: 182.75 LBS | DIASTOLIC BLOOD PRESSURE: 72 MMHG | HEIGHT: 67 IN

## 2023-12-19 DIAGNOSIS — E78.2 DYSLIPIDEMIA WITH LOW HIGH DENSITY LIPOPROTEIN (HDL) CHOLESTEROL WITH HYPERTRIGLYCERIDEMIA DUE TO TYPE 2 DIABETES MELLITUS: ICD-10-CM

## 2023-12-19 DIAGNOSIS — E11.69 CONTROLLED TYPE 2 DIABETES MELLITUS WITH OTHER SPECIFIED COMPLICATION, WITHOUT LONG-TERM CURRENT USE OF INSULIN: Primary | ICD-10-CM

## 2023-12-19 DIAGNOSIS — I70.90 ATHEROSCLEROSIS: ICD-10-CM

## 2023-12-19 DIAGNOSIS — E11.69 DYSLIPIDEMIA WITH LOW HIGH DENSITY LIPOPROTEIN (HDL) CHOLESTEROL WITH HYPERTRIGLYCERIDEMIA DUE TO TYPE 2 DIABETES MELLITUS: ICD-10-CM

## 2023-12-19 DIAGNOSIS — I10 ESSENTIAL HYPERTENSION: ICD-10-CM

## 2023-12-19 PROCEDURE — 99999 PR PBB SHADOW E&M-EST. PATIENT-LVL IV: ICD-10-PCS | Mod: PBBFAC,,, | Performed by: NURSE PRACTITIONER

## 2023-12-19 PROCEDURE — 1159F MED LIST DOCD IN RCRD: CPT | Mod: CPTII,S$GLB,, | Performed by: NURSE PRACTITIONER

## 2023-12-19 PROCEDURE — 3008F BODY MASS INDEX DOCD: CPT | Mod: CPTII,S$GLB,, | Performed by: NURSE PRACTITIONER

## 2023-12-19 PROCEDURE — 3044F HG A1C LEVEL LT 7.0%: CPT | Mod: CPTII,S$GLB,, | Performed by: NURSE PRACTITIONER

## 2023-12-19 PROCEDURE — 1160F RVW MEDS BY RX/DR IN RCRD: CPT | Mod: CPTII,S$GLB,, | Performed by: NURSE PRACTITIONER

## 2023-12-19 PROCEDURE — 99214 PR OFFICE/OUTPT VISIT, EST, LEVL IV, 30-39 MIN: ICD-10-PCS | Mod: S$GLB,,, | Performed by: NURSE PRACTITIONER

## 2023-12-19 PROCEDURE — 3061F NEG MICROALBUMINURIA REV: CPT | Mod: CPTII,S$GLB,, | Performed by: NURSE PRACTITIONER

## 2023-12-19 PROCEDURE — 1160F PR REVIEW ALL MEDS BY PRESCRIBER/CLIN PHARMACIST DOCUMENTED: ICD-10-PCS | Mod: CPTII,S$GLB,, | Performed by: NURSE PRACTITIONER

## 2023-12-19 PROCEDURE — 99214 OFFICE O/P EST MOD 30 MIN: CPT | Mod: S$GLB,,, | Performed by: NURSE PRACTITIONER

## 2023-12-19 PROCEDURE — 3061F PR NEG MICROALBUMINURIA RESULT DOCUMENTED/REVIEW: ICD-10-PCS | Mod: CPTII,S$GLB,, | Performed by: NURSE PRACTITIONER

## 2023-12-19 PROCEDURE — 3078F DIAST BP <80 MM HG: CPT | Mod: CPTII,S$GLB,, | Performed by: NURSE PRACTITIONER

## 2023-12-19 PROCEDURE — 3008F PR BODY MASS INDEX (BMI) DOCUMENTED: ICD-10-PCS | Mod: CPTII,S$GLB,, | Performed by: NURSE PRACTITIONER

## 2023-12-19 PROCEDURE — 3074F PR MOST RECENT SYSTOLIC BLOOD PRESSURE < 130 MM HG: ICD-10-PCS | Mod: CPTII,S$GLB,, | Performed by: NURSE PRACTITIONER

## 2023-12-19 PROCEDURE — 3066F NEPHROPATHY DOC TX: CPT | Mod: CPTII,S$GLB,, | Performed by: NURSE PRACTITIONER

## 2023-12-19 PROCEDURE — 3078F PR MOST RECENT DIASTOLIC BLOOD PRESSURE < 80 MM HG: ICD-10-PCS | Mod: CPTII,S$GLB,, | Performed by: NURSE PRACTITIONER

## 2023-12-19 PROCEDURE — 1159F PR MEDICATION LIST DOCUMENTED IN MEDICAL RECORD: ICD-10-PCS | Mod: CPTII,S$GLB,, | Performed by: NURSE PRACTITIONER

## 2023-12-19 PROCEDURE — 3074F SYST BP LT 130 MM HG: CPT | Mod: CPTII,S$GLB,, | Performed by: NURSE PRACTITIONER

## 2023-12-19 PROCEDURE — 3066F PR DOCUMENTATION OF TREATMENT FOR NEPHROPATHY: ICD-10-PCS | Mod: CPTII,S$GLB,, | Performed by: NURSE PRACTITIONER

## 2023-12-19 PROCEDURE — 3044F PR MOST RECENT HEMOGLOBIN A1C LEVEL <7.0%: ICD-10-PCS | Mod: CPTII,S$GLB,, | Performed by: NURSE PRACTITIONER

## 2023-12-19 PROCEDURE — 99999 PR PBB SHADOW E&M-EST. PATIENT-LVL IV: CPT | Mod: PBBFAC,,, | Performed by: NURSE PRACTITIONER

## 2023-12-19 RX ORDER — METFORMIN HYDROCHLORIDE 500 MG/1
1000 TABLET, EXTENDED RELEASE ORAL 2 TIMES DAILY WITH MEALS
Qty: 360 TABLET | Refills: 3 | Status: SHIPPED | OUTPATIENT
Start: 2023-12-19

## 2023-12-19 NOTE — PROGRESS NOTES
Subjective:         Patient ID: Paulo Esquivel is a 58 y.o. male.  Patient's current PCP is Hussain Toure MD.     Chief Complaint: Diabetes Mellitus      HPI  Paulo Esquivel is a 58 y.o. Unknown male presenting for a follow up for diabetes. Patient has been diagnosed with type 2 diabetes since 2010 . 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     CURRENT DM MEDICATIONS:   Diabetes Medications               glimepiride (AMARYL) 2 MG tablet Take 1 tablet (2 mg total) by mouth before breakfast.    metFORMIN (GLUCOPHAGE-XR) 500 MG ER 24hr tablet TAKE 2 TABLETS BY MOUTH TWICE A DAY WITH MEALS           Past failed treatment include:  Metformin IR- significant GI side effects. Tradjenta- failure to control diabetes. GLP1s- too expensive     Blood glucose testing is performed sporadically.   Meter:  One Touch Verio  Preferred lab: Dr. Toure's office    Any episodes of hypoglycemia? no     Complications related to diabetes: cardiovascular disease    His blood sugar in the clinic today was:   Lab Results   Component Value Date    POCGLU 153 (A) 06/19/2023     Paulo Esquivel presents today for follow up visit to discuss diabetes management. Bgs 70s-low 100s consistently when checked daily. No recent labs.  On Amaryl due to cost only-GLP1 agonists too expensive. Noted with high PSA between visits, but re-check with urology normal. Declines the MRI.     He has a good energy level and is exercising consistently.      CAD- Cardiologist- Dr. Martinez. H/o Double bypass surgery at [facility] 9/25/18 with Dr. Blackwell.    Health and wellness doctor -- Dr. Adolfo Bernstein. Managing low testosterone. Micro-bolusing currently.     Current diet: Smaller,more frequent meals. Drinks 1 gallon water daily. A little juice with meds daily.  Activity Level:  works out 5 days/week and on feet at work all day    A1c with PCP 10/31/2023 = 4.9%  Lab Results  "  Component Value Date    HGBA1C 6.7 (H) 02/23/2023    HGBA1C 6.2 (H) 07/27/2022    HGBA1C 7.2 (H) 03/16/2022     STANDARDS OF CARE  Diabetes Management Status    Statin: Taking  ACE/ARB: Not taking    Screening or Prevention Patient's value Goal Complete/Controlled?   HgA1C Testing and Control   Lab Results   Component Value Date    HGBA1C 6.7 (H) 02/23/2023      Annually/Less than 8% Yes   Lipid profile : 02/23/2023 Annually Yes   LDL control Lab Results   Component Value Date    LDLCALC 53.6 (L) 02/23/2023    Annually/Less than 100 mg/dl  Yes   Nephropathy screening Lab Results   Component Value Date    LABMICR 78.0 02/23/2023     No results found for: "PROTEINUA"  No results found for: "UTPCR"   Annually Yes   Blood pressure BP Readings from Last 1 Encounters:   12/19/23 126/72    Less than 140/90 No   Dilated retinal exam Most Recent Eye Exam Date: Not Found Annually No Does not have vision insurance - he will [facility] cost with [facility]    Foot exam   : 09/06/2022 Annually No      Labs: Requested from PCP - completed 11/2023     Lab Results   Component Value Date    WBC 7.92 07/22/2005    HGB 18.2 (H) 07/22/2005    HCT 52.1 07/22/2005     07/22/2005    CHOL 99 (L) 02/23/2023    TRIG 117 02/23/2023    HDL 22 (L) 02/23/2023    LDLCALC 53.6 (L) 02/23/2023    ALT 61 (H) 02/23/2023    AST 35 02/23/2023     02/23/2023    K 4.3 02/23/2023     02/23/2023    ANIONGAP 11 02/23/2023    CREATININE 0.9 02/23/2023    ESTGFRAFRICA >60.0 03/16/2022    EGFRNONAA >60.0 03/16/2022    BUN 11 02/23/2023    CO2 26 02/23/2023    TSH 3.493 02/23/2023     02/23/2023     Lab Results   Component Value Date    TSH 3.493 02/23/2023    IRON 87 06/23/2022    TIBC 411 06/23/2022    FERRITIN 16.6 (L) 06/23/2022    CALCIUM 9.8 02/23/2023     No results found for: "CPEPTIDE"  No results found for: "GLUTAMICACID"  Glucose   Date Value Ref Range Status   02/23/2023 106 70 - 110 mg/dL Final     Anion Gap   Date " "Value Ref Range Status   02/23/2023 11 8 - 16 mmol/L Final     eGFR if    Date Value Ref Range Status   03/16/2022 >60.0 >60 mL/min/1.73 m^2 Final     eGFR if non    Date Value Ref Range Status   03/16/2022 >60.0 >60 mL/min/1.73 m^2 Final     Comment:     Calculation used to obtain the estimated glomerular filtration  rate (eGFR) is the CKD-EPI equation.        The following portions of the patient's history were reviewed and updated as appropriate: allergies, current medications, past family history, past medical history, past social history, past surgical history and problem list.    Review of patient's allergies indicates:   Allergen Reactions    Bee venom protein (honey bee) Swelling     Bee Sting    Morphine      Other reaction(s): Unknown  "doesn't work"      Ace inhibitors Other (See Comments)     Cough     Social History     Socioeconomic History    Marital status: Single   Tobacco Use    Smoking status: Never    Smokeless tobacco: Never     Past Medical History:   Diagnosis Date    CAD (coronary artery disease)     Diabetes mellitus, type 2     Hyperlipidemia     Kidney disease        REVIEW OF SYSTEMS:  Eyes No history of DR.  Cardiovascular: History of CAD. History of HTN and HLD.   GI: No nausea, vomiting, or diarrhea.   : No CKD.   Neuro: No neuropathy.  PSYCH: No tobacco use.  ENDO: See HPI.        Objective:      Vitals:    12/19/23 1309   BP: 126/72   Pulse: 106     RESPIRATORY: No respiratory distress  NEUROLOGIC: Cranial nerves II-XII grossly intact.   PSYCHIATRIC: Alert & oriented x3. Normal mood and affect.  FOOT EXAMINATION: Deferred- time constraints    Assessment:       1. Controlled type 2 diabetes mellitus with other specified complication, without long-term current use of insulin    2. Essential hypertension    3. Dyslipidemia with low high density lipoprotein (HDL) cholesterol with hypertriglyceridemia due to type 2 diabetes mellitus    4. Atherosclerosis  " "      Plan:   Paulo was seen today for diabetes mellitus.    Diagnoses and all orders for this visit:    Controlled type 2 diabetes mellitus with other specified complication, without long-term current use of insulin  -     POCT Glucose, Hand-Held Device  -     metFORMIN (GLUCOPHAGE-XR) 500 MG ER 24hr tablet; Take 2 tablets (1,000 mg total) by mouth 2 (two) times daily with meals.    Chronic -     Continue Metformin  mg 2 tablets twice daily.    Continue Glimepiride to 2 mg daily.     Essential hypertension    Dyslipidemia with low high density lipoprotein (HDL) cholesterol with hypertriglyceridemia due to type 2 diabetes mellitus    Atherosclerosis    - Follow up:  6 months    Portions of this note may have been created with voice recognition software. Occasional "wrong-word" or "sound-a-like" substitutions may have occurred due to the inherent limitations of voice recognition software. Please, read the note carefully and recognize, using context, where substitutions have occurred.           BRANDY JewellC, BC-ADM  [facility]     "

## 2023-12-19 NOTE — PATIENT INSTRUCTIONS
-Schedule eye exam. Have report faxed when completed.    Blood sugar goals should be a fasting blood sugar between , and no blood sugars throughout the day over 180 is ok, less than 160 better, less than 140 perfect. Keep a log book and bring with you to all appointments along with your glucose meter.    Medications adjusted for today's visit include:    Continue Metformin  mg 2 tablets twice daily.    Continue Glimepiride to 2 mg daily.     Carry glucose tablets with you at all times to treat a low blood sugar episode (less than 70). Chew 4 tablets and re-check blood sugar in 15 minutes. If still low, repeat this. Always call the clinic to give an update for any low blood sugar episodes.

## 2023-12-19 NOTE — LETTER
AUTHORIZATION FOR RELEASE OF   CONFIDENTIAL INFORMATION        We are seeing Paulo Esquivel, date of birth 1965, in the clinic at No Department Specified. Hussain Toure MD is the patient's PCP. Paulo Esquivel has an outstanding lab/procedure at the time we reviewed his chart. In order to help keep his health information updated, he has authorized us to request the following medical record(s):        (  )  MAMMOGRAM                                      (  )  COLONOSCOPY      (  )  PAP SMEAR                                          (  X)  OUTSIDE LAB RESULTS     (  )  DEXA SCAN                                          (  )  EYE EXAM            (  )  FOOT EXAM                                          (  )  ENTIRE RECORD     (  )  OUTSIDE IMMUNIZATIONS                 (  )  _______________         Please fax records to [facility], Sheri Ammons NP , [phone]     If you have any questions, please contact Latesha Denis at [phone].           Patient Name: Paulo Esquivel  : 1965  Patient Phone #: [phone]

## 2024-04-03 NOTE — LETTER
AUTHORIZATION FOR RELEASE OF   CONFIDENTIAL INFORMATION        We are seeing Paulo Esquivel, date of birth 1965, in the clinic at No Department Specified. Hussain Toure MD is the patient's PCP. Paulo Esquivel has an outstanding lab/procedure at the time we reviewed his chart. In order to help keep his health information updated, he has authorized us to request the following medical record(s):        (  )  MAMMOGRAM                                      (  )  COLONOSCOPY      (  )  PAP SMEAR                                          ( X )  OUTSIDE LAB RESULTS     (  )  DEXA SCAN                                          (  )  EYE EXAM            (  )  FOOT EXAM                                          (  )  ENTIRE RECORD     (  )  OUTSIDE IMMUNIZATIONS                 (  )  _______________         Please fax records to [facility], Sheri Ammons,NP, [phone]     If you have any questions, please contact Mikayla Salazar           Patient Name: Paulo Esquivel  : 1965  Patient Phone #: [phone]      CABG on 10/12/2010 with LIMA to LAD, SVG to PDA.  stenting of 99% SVG to RCA using Xience, 3.0 x 12 mm and stenting of 80 to 90% proximal SVG to RPDA using Taxus 2.75 x 32 mm on 4/27/2011.  Stress test on 3/21/2019 showed no perfusion defects.  He has normal LVEF.  The patient is active, pain free and clinically stable.  Will get repeat stress test with nuclear medicine SPECT MPI for further restratification as he is more than 5 years since last stress testing.  - Continue aspirin and beta blockers  - continue medical therapy and risk factor modification.  - dietary and exercise counseling provided.   Patient has had significant statin intolerance in the past.  He has been mixed hyperlipidemia with predominant hypertriglyceridemia.  He is currently on Nexlizet and fenofibrate 135 mg daily.  His LDL on May 2023 was at goal, 67 and triglycerides was 228.  Recent LDL was elevated at 83 and triglycerides 280.   He is compliant with medications.  However he admits to being less physically active and also has gained 9 to 10 pounds.  For now, recommend aggressive diet and lifestyle modifications.  He has verbalized understanding and is in agreement to proceed with more physical exercise and watching his diet.  Will recheck lipid panel in next 4 to 6 weeks.   The patient's blood pressure is at goal.  - continue current antihypertensive medications.  - Diet and life style modifications explained.  - The patient was recommended to check BP daily (different times of the the day) and keep a log of BP readings and bring this to the next appointment.   Home

## 2024-05-05 DIAGNOSIS — E11.69 CONTROLLED TYPE 2 DIABETES MELLITUS WITH OTHER SPECIFIED COMPLICATION, WITHOUT LONG-TERM CURRENT USE OF INSULIN: ICD-10-CM

## 2024-05-06 RX ORDER — GLIMEPIRIDE 2 MG/1
2 TABLET ORAL
Qty: 90 TABLET | Refills: 3 | Status: SHIPPED | OUTPATIENT
Start: 2024-05-06 | End: 2025-05-06

## 2024-06-18 ENCOUNTER — LAB VISIT (OUTPATIENT)
Dept: LAB | Facility: HOSPITAL | Age: 59
End: 2024-06-18
Attending: NURSE PRACTITIONER
Payer: COMMERCIAL

## 2024-06-18 ENCOUNTER — OFFICE VISIT (OUTPATIENT)
Dept: DIABETES | Facility: CLINIC | Age: 59
End: 2024-06-18
Payer: COMMERCIAL

## 2024-06-18 VITALS
BODY MASS INDEX: 28.41 KG/M2 | SYSTOLIC BLOOD PRESSURE: 129 MMHG | WEIGHT: 181 LBS | DIASTOLIC BLOOD PRESSURE: 77 MMHG | HEIGHT: 67 IN | HEART RATE: 101 BPM

## 2024-06-18 DIAGNOSIS — E11.69 CONTROLLED TYPE 2 DIABETES MELLITUS WITH OTHER SPECIFIED COMPLICATION, WITHOUT LONG-TERM CURRENT USE OF INSULIN: ICD-10-CM

## 2024-06-18 DIAGNOSIS — E78.2 DYSLIPIDEMIA WITH LOW HIGH DENSITY LIPOPROTEIN (HDL) CHOLESTEROL WITH HYPERTRIGLYCERIDEMIA DUE TO TYPE 2 DIABETES MELLITUS: ICD-10-CM

## 2024-06-18 DIAGNOSIS — E11.69 DYSLIPIDEMIA WITH LOW HIGH DENSITY LIPOPROTEIN (HDL) CHOLESTEROL WITH HYPERTRIGLYCERIDEMIA DUE TO TYPE 2 DIABETES MELLITUS: ICD-10-CM

## 2024-06-18 DIAGNOSIS — I10 ESSENTIAL HYPERTENSION: ICD-10-CM

## 2024-06-18 DIAGNOSIS — I70.90 ATHEROSCLEROSIS: ICD-10-CM

## 2024-06-18 DIAGNOSIS — E11.69 CONTROLLED TYPE 2 DIABETES MELLITUS WITH OTHER SPECIFIED COMPLICATION, WITHOUT LONG-TERM CURRENT USE OF INSULIN: Primary | ICD-10-CM

## 2024-06-18 LAB
ALBUMIN SERPL BCP-MCNC: 3.8 G/DL (ref 3.5–5.2)
ALBUMIN/CREAT UR: 67.4 UG/MG (ref 0–30)
ALP SERPL-CCNC: 61 U/L (ref 55–135)
ALT SERPL W/O P-5'-P-CCNC: 35 U/L (ref 10–44)
ANION GAP SERPL CALC-SCNC: 9 MMOL/L (ref 8–16)
AST SERPL-CCNC: 26 U/L (ref 10–40)
BILIRUB SERPL-MCNC: 0.3 MG/DL (ref 0.1–1)
BUN SERPL-MCNC: 19 MG/DL (ref 6–20)
CALCIUM SERPL-MCNC: 9.7 MG/DL (ref 8.7–10.5)
CHLORIDE SERPL-SCNC: 108 MMOL/L (ref 95–110)
CO2 SERPL-SCNC: 24 MMOL/L (ref 23–29)
CREAT SERPL-MCNC: 1.2 MG/DL (ref 0.5–1.4)
CREAT UR-MCNC: 193 MG/DL (ref 23–375)
EST. GFR  (NO RACE VARIABLE): >60 ML/MIN/1.73 M^2
ESTIMATED AVG GLUCOSE: 108 MG/DL (ref 68–131)
GLUCOSE SERPL-MCNC: 105 MG/DL (ref 70–110)
GLUCOSE SERPL-MCNC: 157 MG/DL (ref 70–110)
HBA1C MFR BLD: 5.4 % (ref 4–5.6)
MICROALBUMIN UR DL<=1MG/L-MCNC: 130 UG/ML
POTASSIUM SERPL-SCNC: 4.3 MMOL/L (ref 3.5–5.1)
PROT SERPL-MCNC: 6.9 G/DL (ref 6–8.4)
SODIUM SERPL-SCNC: 141 MMOL/L (ref 136–145)

## 2024-06-18 PROCEDURE — 3078F DIAST BP <80 MM HG: CPT | Mod: CPTII,S$GLB,, | Performed by: NURSE PRACTITIONER

## 2024-06-18 PROCEDURE — 99214 OFFICE O/P EST MOD 30 MIN: CPT | Mod: S$GLB,,, | Performed by: NURSE PRACTITIONER

## 2024-06-18 PROCEDURE — 36415 COLL VENOUS BLD VENIPUNCTURE: CPT | Performed by: NURSE PRACTITIONER

## 2024-06-18 PROCEDURE — 80053 COMPREHEN METABOLIC PANEL: CPT | Performed by: NURSE PRACTITIONER

## 2024-06-18 PROCEDURE — 82962 GLUCOSE BLOOD TEST: CPT | Mod: S$GLB,,, | Performed by: NURSE PRACTITIONER

## 2024-06-18 PROCEDURE — 83036 HEMOGLOBIN GLYCOSYLATED A1C: CPT | Performed by: NURSE PRACTITIONER

## 2024-06-18 PROCEDURE — 3074F SYST BP LT 130 MM HG: CPT | Mod: CPTII,S$GLB,, | Performed by: NURSE PRACTITIONER

## 2024-06-18 PROCEDURE — 1160F RVW MEDS BY RX/DR IN RCRD: CPT | Mod: CPTII,S$GLB,, | Performed by: NURSE PRACTITIONER

## 2024-06-18 PROCEDURE — 99999 PR PBB SHADOW E&M-EST. PATIENT-LVL IV: CPT | Mod: PBBFAC,,, | Performed by: NURSE PRACTITIONER

## 2024-06-18 PROCEDURE — 1159F MED LIST DOCD IN RCRD: CPT | Mod: CPTII,S$GLB,, | Performed by: NURSE PRACTITIONER

## 2024-06-18 PROCEDURE — 3008F BODY MASS INDEX DOCD: CPT | Mod: CPTII,S$GLB,, | Performed by: NURSE PRACTITIONER

## 2024-06-18 PROCEDURE — 82570 ASSAY OF URINE CREATININE: CPT | Performed by: NURSE PRACTITIONER

## 2024-06-18 PROCEDURE — 84443 ASSAY THYROID STIM HORMONE: CPT | Performed by: NURSE PRACTITIONER

## 2024-06-18 NOTE — PROGRESS NOTES
Subjective:         Patient ID: Paulo Esquivel is a 59 y.o. male.  Patient's current PCP is Hussain Toure MD.     Chief Complaint: Diabetes Mellitus    HPI  Paulo Esquivel is a 59 y.o. Unknown male presenting for a follow up for diabetes. Patient has been diagnosed with type 2 diabetes since 2010 . 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     CURRENT DM MEDICATIONS:   Diabetes Medications               glimepiride (AMARYL) 2 MG tablet Take 1 tablet (2 mg total) by mouth before breakfast.    metFORMIN (GLUCOPHAGE-XR) 500 MG ER 24hr tablet TAKE 2 TABLETS BY MOUTH TWICE A DAY WITH MEALS     Past failed treatment include:  Metformin IR- significant GI side effects. Tradjenta- failure to control diabetes. GLP1s- too expensive On Amaryl due to cost only-GLP1 agonists too expensive.     Blood glucose testing is performed sporadically.   Meter:  One Touch Verio  Preferred lab: [facility] vs [place] when here    Any episodes of hypoglycemia? no     Complications related to diabetes: cardiovascular disease    His blood sugar in the clinic today was:   Lab Results   Component Value Date    POCGLU 157 (A) 06/18/2024     Paulo Esquivel presents today for follow up visit to discuss diabetes management. Due for labs- agreeable to complete today. Tolerating Metformin and Glimepiride well without side effects.     Previous high PSA between visits, but re-check with urology normal. Declines the MRI.     He has a good energy level and is exercising consistently.      CAD- Dr. Martinez. H/o Double bypass surgery at [facility] 9/25/18 with Dr. Blackwell.    Health and wellness doctor -- Dr. Adolfo Bernstein. Managing low testosterone. Micro-bolusing currently. Now taking a new treatment, infusions every other week -- Plaque-X.      Current diet: Smaller,more frequent meals. Drinks 1 gallon water daily. A little juice with meds daily.  Activity Level:  works out 5  "days/week and on feet at work all day    A1c with PCP 10/31/2023 = 4.9%  Lab Results   Component Value Date    HGBA1C 6.7 (H) 02/23/2023    HGBA1C 6.2 (H) 07/27/2022    HGBA1C 7.2 (H) 03/16/2022     STANDARDS OF CARE  Diabetes Management Status    Statin: Taking  ACE/ARB: Not taking    Screening or Prevention Patient's value Goal Complete/Controlled?   HgA1C Testing and Control   Lab Results   Component Value Date    HGBA1C 6.7 (H) 02/23/2023      Annually/Less than 8% Yes   Lipid profile : 10/31/2023 Annually Yes   LDL control Lab Results   Component Value Date    LDLCALC 53.6 (L) 02/23/2023    Annually/Less than 100 mg/dl  Yes   Nephropathy screening Lab Results   Component Value Date    LABMICR 78.0 02/23/2023     No results found for: "PROTEINUA"  No results found for: "UTPCR"   Annually Yes   Blood pressure BP Readings from Last 1 Encounters:   06/18/24 129/77    Less than 140/90 No   Dilated retinal exam Most Recent Eye Exam Date: Not Found Annually No-Reminded to schedule    Foot exam   : 09/06/2022 Annually No      Labs: Ordered today    Lab Results   Component Value Date    WBC 7.92 07/22/2005    HGB 18.2 (H) 07/22/2005    HCT 52.1 07/22/2005     07/22/2005    CHOL 99 (L) 02/23/2023    TRIG 117 02/23/2023    HDL 22 (L) 02/23/2023    LDLCALC 53.6 (L) 02/23/2023    ALT 61 (H) 02/23/2023    AST 35 02/23/2023     02/23/2023    K 4.3 02/23/2023     02/23/2023    ANIONGAP 11 02/23/2023    CREATININE 0.9 02/23/2023    ESTGFRAFRICA >60.0 03/16/2022    EGFRNONAA >60.0 03/16/2022    BUN 11 02/23/2023    CO2 26 02/23/2023    TSH 3.493 02/23/2023     02/23/2023     Lab Results   Component Value Date    TSH 3.493 02/23/2023    IRON 87 06/23/2022    TIBC 411 06/23/2022    FERRITIN 16.6 (L) 06/23/2022    CALCIUM 9.8 02/23/2023     No results found for: "CPEPTIDE"  No results found for: "GLUTAMICACID"  Glucose   Date Value Ref Range Status   02/23/2023 106 70 - 110 mg/dL Final     Anion Gap   Date " "Value Ref Range Status   02/23/2023 11 8 - 16 mmol/L Final     eGFR if    Date Value Ref Range Status   03/16/2022 >60.0 >60 mL/min/1.73 m^2 Final     eGFR if non    Date Value Ref Range Status   03/16/2022 >60.0 >60 mL/min/1.73 m^2 Final     Comment:     Calculation used to obtain the estimated glomerular filtration  rate (eGFR) is the CKD-EPI equation.        The following portions of the patient's history were reviewed and updated as appropriate: allergies, current medications, past family history, past medical history, past social history, past surgical history and problem list.    Review of patient's allergies indicates:   Allergen Reactions    Bee venom protein (honey bee) Swelling     Bee Sting    Morphine      Other reaction(s): Unknown  "doesn't work"      Ace inhibitors Other (See Comments)     Cough     Social History     Socioeconomic History    Marital status: Single   Tobacco Use    Smoking status: Never    Smokeless tobacco: Never     Past Medical History:   Diagnosis Date    CAD (coronary artery disease)     Diabetes mellitus, type 2     Hyperlipidemia     Kidney disease        REVIEW OF SYSTEMS:  Eyes No history of DR.  Cardiovascular: History of CAD. History of HTN and HLD.   GI: No nausea, vomiting, or diarrhea.   : No CKD.   Neuro: No neuropathy.  PSYCH: No tobacco use.  ENDO: See HPI.        Objective:      Vitals:    06/18/24 1308   BP: 129/77   Pulse: 101       RESPIRATORY: No respiratory distress  NEUROLOGIC: Cranial nerves II-XII grossly intact.   PSYCHIATRIC: Alert & oriented x3. Normal mood and affect.  FOOT EXAMINATION: Deferred- time constraints    Assessment:       1. Controlled type 2 diabetes mellitus with other specified complication, without long-term current use of insulin    2. Essential hypertension    3. Dyslipidemia with low high density lipoprotein (HDL) cholesterol with hypertriglyceridemia due to type 2 diabetes mellitus    4. " "Atherosclerosis        Plan:   Paulo was seen today for diabetes mellitus.    Diagnoses and all orders for this visit:    Controlled type 2 diabetes mellitus with other specified complication, without long-term current use of insulin  -     POCT Glucose, Hand-Held Device  -     POCT Glucose, Hand-Held Device  -     Comprehensive Metabolic Panel; Future  -     Hemoglobin A1C; Future  -     Cancel: Lipid Panel; Future  -     Microalbumin/Creatinine Ratio, Urine; Future  -     TSH; Future    Chronic -     Medications adjusted for today's visit include:    Continue Metformin  mg 2 tablets twice daily. We can discuss trying to wean this if A1c remains at goal.    Continue Glimepiride to 2 mg daily.     Essential hypertension  -     POCT Glucose, Hand-Held Device    Dyslipidemia with low high density lipoprotein (HDL) cholesterol with hypertriglyceridemia due to type 2 diabetes mellitus  -     POCT Glucose, Hand-Held Device    Atherosclerosis      - Follow up:  6 months    Portions of this note may have been created with voice recognition software. Occasional "wrong-word" or "sound-a-like" substitutions may have occurred due to the inherent limitations of voice recognition software. Please, read the note carefully and recognize, using context, where substitutions have occurred.           Delmi Oliva,EVIN-C, BC-ADM  [facility]   "

## 2024-06-18 NOTE — PATIENT INSTRUCTIONS
-Schedule eye exam. Have report faxed when completed.    Blood sugar goals should be a fasting blood sugar between , and no blood sugars throughout the day over 180 is ok, less than 160 better, less than 140 perfect. Keep a log book and bring with you to all appointments along with your glucose meter.    Medications adjusted for today's visit include:    Continue Metformin  mg 2 tablets twice daily. We can discuss trying to wean this if A1c remains at goal.    Continue Glimepiride to 2 mg daily.     Carry glucose tablets with you at all times to treat a low blood sugar episode (less than 70). Chew 4 tablets and re-check blood sugar in 15 minutes. If still low, repeat this. Always call the clinic to give an update for any low blood sugar episodes.

## 2024-06-19 LAB — TSH SERPL DL<=0.005 MIU/L-ACNC: 3.38 UIU/ML (ref 0.4–4)

## 2024-06-20 ENCOUNTER — TELEPHONE (OUTPATIENT)
Dept: DIABETES | Facility: CLINIC | Age: 59
End: 2024-06-20
Payer: COMMERCIAL

## 2024-06-20 NOTE — PROGRESS NOTES
A1c at goal, normal at 5.4%. Mild proteinuria noted.Kidney function per blood work remains normal. Normal liver function, thyroid level.

## 2024-06-20 NOTE — TELEPHONE ENCOUNTER
Pt was confirmed lab results pt verbally understands results                                         ----- Message from Delmi Oliva NP sent at 6/20/2024 10:12 AM CDT -----  A1c at goal, normal at 5.4%. Mild proteinuria noted.Kidney function per blood work remains normal. Normal liver function, thyroid level.

## 2024-11-19 ENCOUNTER — PATIENT MESSAGE (OUTPATIENT)
Dept: NEUROLOGY | Facility: CLINIC | Age: 59
End: 2024-11-19
Payer: COMMERCIAL

## 2024-11-28 DIAGNOSIS — E11.69 CONTROLLED TYPE 2 DIABETES MELLITUS WITH OTHER SPECIFIED COMPLICATION, WITHOUT LONG-TERM CURRENT USE OF INSULIN: ICD-10-CM

## 2024-11-29 RX ORDER — METFORMIN HYDROCHLORIDE 500 MG/1
1000 TABLET, EXTENDED RELEASE ORAL 2 TIMES DAILY WITH MEALS
Qty: 360 TABLET | Refills: 0 | Status: SHIPPED | OUTPATIENT
Start: 2024-11-29

## 2024-12-26 ENCOUNTER — OFFICE VISIT (OUTPATIENT)
Dept: DIABETES | Facility: CLINIC | Age: 59
End: 2024-12-26
Payer: COMMERCIAL

## 2024-12-26 VITALS
BODY MASS INDEX: 28.93 KG/M2 | WEIGHT: 184.31 LBS | DIASTOLIC BLOOD PRESSURE: 81 MMHG | HEART RATE: 112 BPM | HEIGHT: 67 IN | SYSTOLIC BLOOD PRESSURE: 145 MMHG

## 2024-12-26 DIAGNOSIS — I10 ESSENTIAL HYPERTENSION: ICD-10-CM

## 2024-12-26 DIAGNOSIS — E11.69 CONTROLLED TYPE 2 DIABETES MELLITUS WITH OTHER SPECIFIED COMPLICATION, WITHOUT LONG-TERM CURRENT USE OF INSULIN: Primary | ICD-10-CM

## 2024-12-26 DIAGNOSIS — E11.69 DYSLIPIDEMIA WITH LOW HIGH DENSITY LIPOPROTEIN (HDL) CHOLESTEROL WITH HYPERTRIGLYCERIDEMIA DUE TO TYPE 2 DIABETES MELLITUS: ICD-10-CM

## 2024-12-26 DIAGNOSIS — I70.90 ATHEROSCLEROSIS: ICD-10-CM

## 2024-12-26 DIAGNOSIS — E78.2 DYSLIPIDEMIA WITH LOW HIGH DENSITY LIPOPROTEIN (HDL) CHOLESTEROL WITH HYPERTRIGLYCERIDEMIA DUE TO TYPE 2 DIABETES MELLITUS: ICD-10-CM

## 2024-12-26 LAB — GLUCOSE SERPL-MCNC: 104 MG/DL (ref 70–110)

## 2024-12-26 PROCEDURE — 3008F BODY MASS INDEX DOCD: CPT | Mod: CPTII,S$GLB,, | Performed by: NURSE PRACTITIONER

## 2024-12-26 PROCEDURE — 3060F POS MICROALBUMINURIA REV: CPT | Mod: CPTII,S$GLB,, | Performed by: NURSE PRACTITIONER

## 2024-12-26 PROCEDURE — 99214 OFFICE O/P EST MOD 30 MIN: CPT | Mod: S$GLB,,, | Performed by: NURSE PRACTITIONER

## 2024-12-26 PROCEDURE — 1160F RVW MEDS BY RX/DR IN RCRD: CPT | Mod: CPTII,S$GLB,, | Performed by: NURSE PRACTITIONER

## 2024-12-26 PROCEDURE — 3077F SYST BP >= 140 MM HG: CPT | Mod: CPTII,S$GLB,, | Performed by: NURSE PRACTITIONER

## 2024-12-26 PROCEDURE — 82962 GLUCOSE BLOOD TEST: CPT | Mod: S$GLB,,, | Performed by: NURSE PRACTITIONER

## 2024-12-26 PROCEDURE — 3079F DIAST BP 80-89 MM HG: CPT | Mod: CPTII,S$GLB,, | Performed by: NURSE PRACTITIONER

## 2024-12-26 PROCEDURE — 3044F HG A1C LEVEL LT 7.0%: CPT | Mod: CPTII,S$GLB,, | Performed by: NURSE PRACTITIONER

## 2024-12-26 PROCEDURE — 99999 PR PBB SHADOW E&M-EST. PATIENT-LVL IV: CPT | Mod: PBBFAC,,, | Performed by: NURSE PRACTITIONER

## 2024-12-26 PROCEDURE — 3066F NEPHROPATHY DOC TX: CPT | Mod: CPTII,S$GLB,, | Performed by: NURSE PRACTITIONER

## 2024-12-26 PROCEDURE — 1159F MED LIST DOCD IN RCRD: CPT | Mod: CPTII,S$GLB,, | Performed by: NURSE PRACTITIONER

## 2024-12-26 RX ORDER — GLIMEPIRIDE 2 MG/1
2 TABLET ORAL
Qty: 90 TABLET | Refills: 3 | Status: SHIPPED | OUTPATIENT
Start: 2024-12-26 | End: 2025-12-26

## 2024-12-26 RX ORDER — METFORMIN HYDROCHLORIDE 500 MG/1
1000 TABLET, EXTENDED RELEASE ORAL 2 TIMES DAILY WITH MEALS
Qty: 360 TABLET | Refills: 3 | Status: SHIPPED | OUTPATIENT
Start: 2024-12-26

## 2024-12-26 NOTE — PATIENT INSTRUCTIONS
-Schedule eye exam. Have report faxed when completed.    Blood sugar goals should be a fasting blood sugar between , and no blood sugars throughout the day over 180 is ok, less than 160 better, less than 140 perfect. Keep a log book and bring with you to all appointments along with your glucose meter.    Medications adjusted for today's visit include:    Continue Metformin  mg 2 tablets twice daily.     Continue Glimepiride 2 mg daily.     Carry glucose tablets with you at all times to treat a low blood sugar episode (less than 70). Chew 4 tablets and re-check blood sugar in 15 minutes. If still low, repeat this. Always call the clinic to give an update for any low blood sugar episodes.

## 2024-12-26 NOTE — PROGRESS NOTES
Subjective:         Patient ID: Paulo Esquivel is a 59 y.o. male.  Patient's current PCP is Hussain Toure MD.     Chief Complaint: Diabetes Mellitus    HPI  Paulo Esquivel is a 59 y.o. Unknown male presenting for a follow up for diabetes. Patient has been diagnosed with type 2 diabetes since 2010 . 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     CURRENT DM MEDICATIONS:   Diabetes Medications               glimepiride (AMARYL) 2 MG tablet Take 1 tablet (2 mg total) by mouth before breakfast.    metFORMIN (GLUCOPHAGE-XR) 500 MG ER 24hr tablet TAKE 2 TABLETS BY MOUTH TWICE A DAY WITH MEALS     Past failed treatment include:  Metformin IR- significant GI side effects. Tradjenta- failure to control diabetes. GLP1s- too expensive On Amaryl due to cost only-GLP1 agonists too expensive.     Blood glucose testing is performed sporadically.   Meter:  One Touch Verio  Preferred lab: [facility] vs [place] when here    Any episodes of hypoglycemia? no     Complications related to diabetes: cardiovascular disease    His blood sugar in the clinic today was:   Lab Results   Component Value Date    POCGLU 104 12/26/2024     Paulo Esquivel presents today for follow up visit to discuss diabetes management.    Tolerating Metformin and Glimepiride well without side effects. Reports he recently completed blood work with his wellness physician and will check to see if this included an A1c.     He has a good energy level and is exercising consistently.      CAD- Dr. Martinez. H/o [facility] bypass [facility] 9/25/18 with Dr. Blackwell.    Health and wellness doctor -- Dr. Adolfo Bernstein. Managing low testosterone. Micro-bolusing currently. Now taking a new treatment, infusions every other week -- Plaque-X.      Current diet: Smaller,more frequent meals. Drinks 1 gallon water daily. A little juice with meds daily.  Activity Level:  works out 5 days/week and on feet  "at work all day    Lab Results   Component Value Date    HGBA1C 5.4 06/18/2024    HGBA1C 6.7 (H) 02/23/2023    HGBA1C 6.2 (H) 07/27/2022     STANDARDS OF CARE  Diabetes Management Status    Statin: Taking  ACE/ARB: Not taking    Screening or Prevention Patient's value Goal Complete/Controlled?   HgA1C Testing and Control   Lab Results   Component Value Date    HGBA1C 5.4 06/18/2024      Annually/Less than 8% Yes   Lipid profile : 07/12/2024 Annually No   LDL control Lab Results   Component Value Date    LDLCALC 53.6 (L) 02/23/2023    Annually/Less than 100 mg/dl  No   Nephropathy screening Lab Results   Component Value Date    LABMICR 130.0 06/18/2024     No results found for: "PROTEINUA"  No results found for: "UTPCR"   Annually Yes   Blood pressure BP Readings from Last 1 Encounters:   12/26/24 (!) 145/81    Less than 140/90 No   Dilated retinal exam Most Recent Eye Exam Date: Not Found Annually No   Foot exam   : 09/06/2022 Annually No      Lab Results   Component Value Date    WBC 7.92 07/22/2005    HGB 18.2 (H) 07/22/2005    HCT 52.1 07/22/2005     07/22/2005    CHOL 99 (L) 02/23/2023    TRIG 117 02/23/2023    HDL 22 (L) 02/23/2023    LDLCALC 53.6 (L) 02/23/2023    ALT 35 06/18/2024    AST 26 06/18/2024     06/18/2024    K 4.3 06/18/2024     06/18/2024    ANIONGAP 9 06/18/2024    CREATININE 1.2 06/18/2024    ESTGFRAFRICA >60.0 03/16/2022    EGFRNONAA >60.0 03/16/2022    BUN 19 06/18/2024    CO2 24 06/18/2024    TSH 3.383 06/18/2024     06/18/2024     Lab Results   Component Value Date    TSH 3.383 06/18/2024    IRON 87 06/23/2022    TIBC 411 06/23/2022    FERRITIN 16.6 (L) 06/23/2022    CALCIUM 9.7 06/18/2024     No results found for: "CPEPTIDE"  No results found for: "GLUTAMICACID"  Glucose   Date Value Ref Range Status   06/18/2024 105 70 - 110 mg/dL Final     Anion Gap   Date Value Ref Range Status   06/18/2024 9 8 - 16 mmol/L Final     eGFR if    Date Value Ref " "Range Status   03/16/2022 >60.0 >60 mL/min/1.73 m^2 Final     eGFR if non    Date Value Ref Range Status   03/16/2022 >60.0 >60 mL/min/1.73 m^2 Final     Comment:     Calculation used to obtain the estimated glomerular filtration  rate (eGFR) is the CKD-EPI equation.        The following portions of the patient's history were reviewed and updated as appropriate: allergies, current medications, past family history, past medical history, past social history, past surgical history and problem list.    Review of patient's allergies indicates:   Allergen Reactions    Bee venom protein (honey bee) Swelling     Bee Sting    Morphine      Other reaction(s): Unknown  "doesn't work"      Ace inhibitors Other (See Comments)     Cough     Social History     Socioeconomic History    Marital status: Single   Tobacco Use    Smoking status: Never    Smokeless tobacco: Never     Past Medical History:   Diagnosis Date    CAD (coronary artery disease)     Diabetes mellitus, type 2     Hyperlipidemia     Kidney disease        REVIEW OF SYSTEMS:  Eyes No history of DR.  Cardiovascular: History of CAD. History of HTN and HLD.   GI: No nausea, vomiting, or diarrhea.   : No CKD.   Neuro: No neuropathy.  PSYCH: No tobacco use.  ENDO: See HPI.        Objective:      Vitals:    12/26/24 1259   BP: (!) 145/81   Pulse: (!) 112     RESPIRATORY: No respiratory distress  NEUROLOGIC: Cranial nerves II-XII grossly intact.   PSYCHIATRIC: Alert & oriented x3. Normal mood and affect.  FOOT EXAMINATION: Deferred- time constraints    Assessment:       1. Controlled type 2 diabetes mellitus with other specified complication, without long-term current use of insulin    2. Essential hypertension    3. Dyslipidemia with low high density lipoprotein (HDL) cholesterol with hypertriglyceridemia due to type 2 diabetes mellitus    4. Atherosclerosis        Plan:   Paulo was seen today for diabetes mellitus.    Diagnoses and all orders for this " "visit:    Controlled type 2 diabetes mellitus with other specified complication, without long-term current use of insulin  -     POCT Glucose, Hand-Held Device  -     metFORMIN (GLUCOPHAGE-XR) 500 MG ER 24hr tablet; Take 2 tablets (1,000 mg total) by mouth 2 (two) times daily with meals.  -     glimepiride (AMARYL) 2 MG tablet; Take 1 tablet (2 mg total) by mouth before breakfast.    Chronic -     Continue Metformin  mg 2 tablets twice daily.     Continue Glimepiride to 2 mg daily.       Essential hypertension    Dyslipidemia with low high density lipoprotein (HDL) cholesterol with hypertriglyceridemia due to type 2 diabetes mellitus    Atherosclerosis        - Follow up:  6 months    Portions of this note may have been created with voice recognition software. Occasional "wrong-word" or "sound-a-like" substitutions may have occurred due to the inherent limitations of voice recognition software. Please, read the note carefully and recognize, using context, where substitutions have occurred.           Delmi Oliva,EVIN-C, BC-ADM  [facility]   "